# Patient Record
Sex: MALE | Race: WHITE | Employment: OTHER | URBAN - NONMETROPOLITAN AREA
[De-identification: names, ages, dates, MRNs, and addresses within clinical notes are randomized per-mention and may not be internally consistent; named-entity substitution may affect disease eponyms.]

---

## 2018-11-14 RX ORDER — M-VIT,TX,IRON,MINS/CALC/FOLIC 27MG-0.4MG
1 TABLET ORAL DAILY
COMMUNITY

## 2018-11-14 RX ORDER — ASCORBIC ACID 500 MG
500 TABLET ORAL DAILY
COMMUNITY

## 2018-11-15 ENCOUNTER — HOSPITAL ENCOUNTER (OUTPATIENT)
Age: 68
Setting detail: SPECIMEN
Discharge: HOME | End: 2018-11-15
Payer: MEDICARE

## 2018-11-15 ENCOUNTER — OFFICE VISIT (OUTPATIENT)
Dept: UROLOGY | Age: 68
End: 2018-11-15
Payer: MEDICARE

## 2018-11-15 VITALS — TEMPERATURE: 97.5 F | WEIGHT: 144 LBS | DIASTOLIC BLOOD PRESSURE: 80 MMHG | SYSTOLIC BLOOD PRESSURE: 120 MMHG

## 2018-11-15 DIAGNOSIS — K59.00 CONSTIPATION, UNSPECIFIED CONSTIPATION TYPE: ICD-10-CM

## 2018-11-15 DIAGNOSIS — R33.9 URINARY RETENTION: Primary | ICD-10-CM

## 2018-11-15 DIAGNOSIS — R33.9 URINARY RETENTION: ICD-10-CM

## 2018-11-15 LAB
-: ABNORMAL
AMORPHOUS: ABNORMAL
BACTERIA: ABNORMAL
BILIRUBIN URINE: NEGATIVE
CASTS UA: ABNORMAL /LPF
COLOR: YELLOW
COMMENT UA: ABNORMAL
CRYSTALS, UA: ABNORMAL /HPF
EPITHELIAL CELLS UA: ABNORMAL /HPF (ref 0–5)
GLUCOSE URINE: NEGATIVE
KETONES, URINE: ABNORMAL
LEUKOCYTE ESTERASE, URINE: NEGATIVE
MUCUS: ABNORMAL
NITRITE, URINE: NEGATIVE
OTHER OBSERVATIONS UA: ABNORMAL
PH UA: 6.5 (ref 5–9)
PROTEIN UA: ABNORMAL
RBC UA: ABNORMAL /HPF (ref 0–2)
RENAL EPITHELIAL, UA: ABNORMAL /HPF
SPECIFIC GRAVITY UA: 1.02 (ref 1.01–1.02)
TRICHOMONAS: ABNORMAL
TURBIDITY: ABNORMAL
URINE HGB: ABNORMAL
UROBILINOGEN, URINE: NORMAL
WBC UA: ABNORMAL /HPF (ref 0–5)
YEAST: ABNORMAL

## 2018-11-15 PROCEDURE — 4040F PNEUMOC VAC/ADMIN/RCVD: CPT | Performed by: NURSE PRACTITIONER

## 2018-11-15 PROCEDURE — 87086 URINE CULTURE/COLONY COUNT: CPT

## 2018-11-15 PROCEDURE — 87077 CULTURE AEROBIC IDENTIFY: CPT

## 2018-11-15 PROCEDURE — 1036F TOBACCO NON-USER: CPT | Performed by: NURSE PRACTITIONER

## 2018-11-15 PROCEDURE — 1123F ACP DISCUSS/DSCN MKR DOCD: CPT | Performed by: NURSE PRACTITIONER

## 2018-11-15 PROCEDURE — 99204 OFFICE O/P NEW MOD 45 MIN: CPT | Performed by: NURSE PRACTITIONER

## 2018-11-15 PROCEDURE — 87186 SC STD MICRODIL/AGAR DIL: CPT

## 2018-11-15 PROCEDURE — 3017F COLORECTAL CA SCREEN DOC REV: CPT | Performed by: NURSE PRACTITIONER

## 2018-11-15 PROCEDURE — G8484 FLU IMMUNIZE NO ADMIN: HCPCS | Performed by: NURSE PRACTITIONER

## 2018-11-15 PROCEDURE — 81001 URINALYSIS AUTO W/SCOPE: CPT

## 2018-11-15 PROCEDURE — 1101F PT FALLS ASSESS-DOCD LE1/YR: CPT | Performed by: NURSE PRACTITIONER

## 2018-11-15 PROCEDURE — G8421 BMI NOT CALCULATED: HCPCS | Performed by: NURSE PRACTITIONER

## 2018-11-15 PROCEDURE — G8427 DOCREV CUR MEDS BY ELIG CLIN: HCPCS | Performed by: NURSE PRACTITIONER

## 2018-11-15 RX ORDER — DOCUSATE SODIUM 100 MG/1
100 CAPSULE, LIQUID FILLED ORAL 2 TIMES DAILY
Qty: 60 CAPSULE | Refills: 2 | Status: SHIPPED | OUTPATIENT
Start: 2018-11-15 | End: 2018-12-10

## 2018-11-15 RX ORDER — TAMSULOSIN HYDROCHLORIDE 0.4 MG/1
0.4 CAPSULE ORAL EVERY EVENING
Qty: 30 CAPSULE | Refills: 11 | Status: SHIPPED | OUTPATIENT
Start: 2018-11-15 | End: 2018-12-10

## 2018-11-16 LAB
CULTURE: ABNORMAL
Lab: ABNORMAL
ORGANISM: ABNORMAL
SPECIMEN DESCRIPTION: ABNORMAL
STATUS: ABNORMAL

## 2018-11-17 PROBLEM — R33.9 URINARY RETENTION: Status: ACTIVE | Noted: 2018-11-17

## 2018-11-17 ASSESSMENT — ENCOUNTER SYMPTOMS
EYE PAIN: 0
ABDOMINAL PAIN: 0
CONSTIPATION: 1
WHEEZING: 0
VOMITING: 0
SHORTNESS OF BREATH: 0
BACK PAIN: 0
NAUSEA: 0
COUGH: 0
COLOR CHANGE: 0
EYE REDNESS: 0

## 2018-11-19 ENCOUNTER — TELEPHONE (OUTPATIENT)
Dept: UROLOGY | Age: 68
End: 2018-11-19

## 2018-11-19 DIAGNOSIS — R33.9 URINARY RETENTION: Primary | ICD-10-CM

## 2018-11-19 DIAGNOSIS — R33.9 URINARY RETENTION: ICD-10-CM

## 2018-11-19 RX ORDER — CIPROFLOXACIN 500 MG/1
500 TABLET, FILM COATED ORAL 2 TIMES DAILY
Qty: 20 TABLET | Refills: 0 | Status: SHIPPED | OUTPATIENT
Start: 2018-11-19 | End: 2018-11-29 | Stop reason: SDUPTHER

## 2018-11-19 NOTE — TELEPHONE ENCOUNTER
Renal function is normal. I sent in culture specific cipro to treat UTI. Take this antibiotic until gone. Take this with food and eat yogurt once per day to prevent GI upset. If you develop nausea, vomiting, or fevers call the office or go to the ER.  Keep f/u as planned

## 2018-11-21 ENCOUNTER — TELEPHONE (OUTPATIENT)
Dept: UROLOGY | Age: 68
End: 2018-11-21

## 2018-11-26 ENCOUNTER — TELEPHONE (OUTPATIENT)
Dept: UROLOGY | Age: 68
End: 2018-11-26

## 2018-11-26 ENCOUNTER — OFFICE VISIT (OUTPATIENT)
Dept: UROLOGY | Age: 68
End: 2018-11-26
Payer: MEDICARE

## 2018-11-26 VITALS
SYSTOLIC BLOOD PRESSURE: 118 MMHG | DIASTOLIC BLOOD PRESSURE: 76 MMHG | TEMPERATURE: 98.1 F | HEART RATE: 71 BPM | WEIGHT: 146 LBS

## 2018-11-26 DIAGNOSIS — K59.00 CONSTIPATION, UNSPECIFIED CONSTIPATION TYPE: ICD-10-CM

## 2018-11-26 DIAGNOSIS — R33.9 URINARY RETENTION: Primary | ICD-10-CM

## 2018-11-26 PROCEDURE — 51798 US URINE CAPACITY MEASURE: CPT | Performed by: NURSE PRACTITIONER

## 2018-11-26 PROCEDURE — 1036F TOBACCO NON-USER: CPT | Performed by: NURSE PRACTITIONER

## 2018-11-26 PROCEDURE — 99213 OFFICE O/P EST LOW 20 MIN: CPT | Performed by: NURSE PRACTITIONER

## 2018-11-26 PROCEDURE — G8427 DOCREV CUR MEDS BY ELIG CLIN: HCPCS | Performed by: NURSE PRACTITIONER

## 2018-11-26 PROCEDURE — G8484 FLU IMMUNIZE NO ADMIN: HCPCS | Performed by: NURSE PRACTITIONER

## 2018-11-26 PROCEDURE — 1101F PT FALLS ASSESS-DOCD LE1/YR: CPT | Performed by: NURSE PRACTITIONER

## 2018-11-26 PROCEDURE — G8421 BMI NOT CALCULATED: HCPCS | Performed by: NURSE PRACTITIONER

## 2018-11-26 PROCEDURE — 1123F ACP DISCUSS/DSCN MKR DOCD: CPT | Performed by: NURSE PRACTITIONER

## 2018-11-26 PROCEDURE — 51700 IRRIGATION OF BLADDER: CPT | Performed by: NURSE PRACTITIONER

## 2018-11-26 PROCEDURE — 4040F PNEUMOC VAC/ADMIN/RCVD: CPT | Performed by: NURSE PRACTITIONER

## 2018-11-26 PROCEDURE — 3017F COLORECTAL CA SCREEN DOC REV: CPT | Performed by: NURSE PRACTITIONER

## 2018-11-26 ASSESSMENT — ENCOUNTER SYMPTOMS
EYE PAIN: 0
CONSTIPATION: 0
EYE REDNESS: 0
WHEEZING: 0
SHORTNESS OF BREATH: 0
ABDOMINAL PAIN: 0
NAUSEA: 0
COUGH: 0
BACK PAIN: 0
COLOR CHANGE: 0
VOMITING: 0

## 2018-11-28 ENCOUNTER — TELEPHONE (OUTPATIENT)
Dept: UROLOGY | Age: 68
End: 2018-11-28

## 2018-11-29 ENCOUNTER — TELEPHONE (OUTPATIENT)
Dept: UROLOGY | Age: 68
End: 2018-11-29

## 2018-11-29 DIAGNOSIS — R35.1 NOCTURIA: ICD-10-CM

## 2018-11-29 DIAGNOSIS — R35.0 FREQUENCY OF URINATION: Primary | ICD-10-CM

## 2018-11-29 RX ORDER — CIPROFLOXACIN 500 MG/1
500 TABLET, FILM COATED ORAL 2 TIMES DAILY
Qty: 10 TABLET | Refills: 0 | Status: SHIPPED | OUTPATIENT
Start: 2018-11-29 | End: 2018-12-04

## 2018-11-29 NOTE — TELEPHONE ENCOUNTER
The flomax helps patient void more effectively. It does not cause frequency or nocturia, it should help these symptoms. It is likely an infection. Drop off a UACS now. I sent in 5 more days of the antibiotic.

## 2018-11-30 ENCOUNTER — HOSPITAL ENCOUNTER (OUTPATIENT)
Age: 68
Discharge: HOME | End: 2018-11-30
Payer: MEDICARE

## 2018-11-30 DIAGNOSIS — R35.1 NOCTURIA: ICD-10-CM

## 2018-11-30 DIAGNOSIS — R35.0 FREQUENCY OF URINATION: ICD-10-CM

## 2018-11-30 LAB
-: ABNORMAL
AMORPHOUS: ABNORMAL
BACTERIA: ABNORMAL
BILIRUBIN URINE: NEGATIVE
CASTS UA: ABNORMAL /LPF
COLOR: YELLOW
COMMENT UA: ABNORMAL
CRYSTALS, UA: ABNORMAL /HPF
EPITHELIAL CELLS UA: ABNORMAL /HPF (ref 0–5)
GLUCOSE URINE: NEGATIVE
KETONES, URINE: NEGATIVE
LEUKOCYTE ESTERASE, URINE: NEGATIVE
MUCUS: ABNORMAL
NITRITE, URINE: NEGATIVE
OTHER OBSERVATIONS UA: ABNORMAL
PH UA: 6 (ref 5–9)
PROTEIN UA: NEGATIVE
RBC UA: ABNORMAL /HPF (ref 0–2)
RENAL EPITHELIAL, UA: ABNORMAL /HPF
SPECIFIC GRAVITY UA: <1.005 (ref 1.01–1.02)
TRICHOMONAS: ABNORMAL
TURBIDITY: CLEAR
URINE HGB: ABNORMAL
UROBILINOGEN, URINE: NORMAL
WBC UA: ABNORMAL /HPF (ref 0–5)
YEAST: ABNORMAL

## 2018-11-30 PROCEDURE — 81001 URINALYSIS AUTO W/SCOPE: CPT

## 2018-11-30 PROCEDURE — 87086 URINE CULTURE/COLONY COUNT: CPT

## 2018-12-01 LAB
CULTURE: NO GROWTH
Lab: NORMAL
SPECIMEN DESCRIPTION: NORMAL
STATUS: NORMAL

## 2018-12-03 ENCOUNTER — TELEPHONE (OUTPATIENT)
Dept: UROLOGY | Age: 68
End: 2018-12-03

## 2018-12-03 NOTE — TELEPHONE ENCOUNTER
----- Message from ALVERTO Heck - CNP sent at 12/3/2018 10:38 AM EST -----  Call pt - urine cx reviewed and negative for UTI & for significant microhematuria

## 2018-12-10 ENCOUNTER — OFFICE VISIT (OUTPATIENT)
Dept: UROLOGY | Age: 68
End: 2018-12-10
Payer: MEDICARE

## 2018-12-10 VITALS
SYSTOLIC BLOOD PRESSURE: 110 MMHG | WEIGHT: 140 LBS | BODY MASS INDEX: 23.9 KG/M2 | DIASTOLIC BLOOD PRESSURE: 70 MMHG | HEIGHT: 64 IN

## 2018-12-10 DIAGNOSIS — R33.9 URINARY RETENTION: Primary | ICD-10-CM

## 2018-12-10 DIAGNOSIS — N40.1 BPH WITH OBSTRUCTION/LOWER URINARY TRACT SYMPTOMS: ICD-10-CM

## 2018-12-10 DIAGNOSIS — N13.8 BPH WITH OBSTRUCTION/LOWER URINARY TRACT SYMPTOMS: ICD-10-CM

## 2018-12-10 PROCEDURE — 1101F PT FALLS ASSESS-DOCD LE1/YR: CPT | Performed by: UROLOGY

## 2018-12-10 PROCEDURE — G8420 CALC BMI NORM PARAMETERS: HCPCS | Performed by: UROLOGY

## 2018-12-10 PROCEDURE — 4040F PNEUMOC VAC/ADMIN/RCVD: CPT | Performed by: UROLOGY

## 2018-12-10 PROCEDURE — G8427 DOCREV CUR MEDS BY ELIG CLIN: HCPCS | Performed by: UROLOGY

## 2018-12-10 PROCEDURE — 3017F COLORECTAL CA SCREEN DOC REV: CPT | Performed by: UROLOGY

## 2018-12-10 PROCEDURE — G8484 FLU IMMUNIZE NO ADMIN: HCPCS | Performed by: UROLOGY

## 2018-12-10 PROCEDURE — 99214 OFFICE O/P EST MOD 30 MIN: CPT | Performed by: UROLOGY

## 2018-12-10 PROCEDURE — 51798 US URINE CAPACITY MEASURE: CPT | Performed by: UROLOGY

## 2018-12-10 PROCEDURE — 1036F TOBACCO NON-USER: CPT | Performed by: UROLOGY

## 2018-12-10 PROCEDURE — 1123F ACP DISCUSS/DSCN MKR DOCD: CPT | Performed by: UROLOGY

## 2018-12-10 RX ORDER — CAPECITABINE 500 MG/1
1500 TABLET, FILM COATED ORAL 2 TIMES DAILY
Status: ON HOLD | COMMUNITY
End: 2018-12-27 | Stop reason: HOSPADM

## 2018-12-10 ASSESSMENT — ENCOUNTER SYMPTOMS
EYE PAIN: 0
COUGH: 0
COLOR CHANGE: 0
WHEEZING: 0
EYE REDNESS: 0
ABDOMINAL PAIN: 0
NAUSEA: 0
SHORTNESS OF BREATH: 0
BACK PAIN: 0
VOMITING: 0

## 2018-12-24 ENCOUNTER — APPOINTMENT (OUTPATIENT)
Dept: CT IMAGING | Age: 68
DRG: 176 | End: 2018-12-24
Payer: MEDICARE

## 2018-12-24 ENCOUNTER — HOSPITAL ENCOUNTER (INPATIENT)
Age: 68
LOS: 3 days | Discharge: HOME | DRG: 176 | End: 2018-12-27
Attending: EMERGENCY MEDICINE
Payer: MEDICARE

## 2018-12-24 DIAGNOSIS — I26.02 ACUTE SADDLE PULMONARY EMBOLISM WITH ACUTE COR PULMONALE (HCC): Primary | ICD-10-CM

## 2018-12-24 PROBLEM — I26.99 PULMONARY EMBOLISM, BILATERAL (HCC): Status: ACTIVE | Noted: 2018-12-24

## 2018-12-24 LAB
ABSOLUTE EOS #: 0.13 K/UL (ref 0–0.44)
ABSOLUTE IMMATURE GRANULOCYTE: 0.04 K/UL (ref 0–0.3)
ABSOLUTE LYMPH #: 1.73 K/UL (ref 1.1–3.7)
ABSOLUTE MONO #: 0.63 K/UL (ref 0.1–1.2)
ACETAMINOPHEN LEVEL: 8 UG/ML (ref 10–30)
ALBUMIN SERPL-MCNC: 3.4 G/DL (ref 3.5–5.2)
ALBUMIN/GLOBULIN RATIO: 1.4 (ref 1–2.5)
ALLEN TEST: ABNORMAL
ALP BLD-CCNC: 115 U/L (ref 40–129)
ALT SERPL-CCNC: 22 U/L (ref 5–41)
AMPHETAMINE SCREEN URINE: NEGATIVE
ANION GAP SERPL CALCULATED.3IONS-SCNC: 13 MMOL/L (ref 9–17)
ANION GAP: 9 MMOL/L (ref 7–16)
AST SERPL-CCNC: 27 U/L
BARBITURATE SCREEN URINE: NEGATIVE
BASOPHILS # BLD: 1 % (ref 0–2)
BASOPHILS ABSOLUTE: 0.06 K/UL (ref 0–0.2)
BENZODIAZEPINE SCREEN, URINE: NEGATIVE
BILIRUB SERPL-MCNC: 0.52 MG/DL (ref 0.3–1.2)
BILIRUBIN DIRECT: 0.15 MG/DL
BILIRUBIN URINE: NEGATIVE
BILIRUBIN, INDIRECT: 0.37 MG/DL (ref 0–1)
BNP INTERPRETATION: ABNORMAL
BUN BLDV-MCNC: 15 MG/DL (ref 8–23)
BUN/CREAT BLD: ABNORMAL (ref 9–20)
BUPRENORPHINE URINE: NORMAL
CALCIUM SERPL-MCNC: 8.8 MG/DL (ref 8.6–10.4)
CANNABINOID SCREEN URINE: NEGATIVE
CHLORIDE BLD-SCNC: 105 MMOL/L (ref 98–107)
CO2: 23 MMOL/L (ref 20–31)
COCAINE METABOLITE, URINE: NEGATIVE
COLOR: YELLOW
COMMENT UA: ABNORMAL
CREAT SERPL-MCNC: 0.92 MG/DL (ref 0.7–1.2)
DIFFERENTIAL TYPE: ABNORMAL
EKG ATRIAL RATE: 77 BPM
EKG P AXIS: 63 DEGREES
EKG P-R INTERVAL: 176 MS
EKG Q-T INTERVAL: 442 MS
EKG QRS DURATION: 90 MS
EKG QTC CALCULATION (BAZETT): 500 MS
EKG R AXIS: 51 DEGREES
EKG T AXIS: 47 DEGREES
EKG VENTRICULAR RATE: 77 BPM
EOSINOPHILS RELATIVE PERCENT: 2 % (ref 1–4)
ETHANOL PERCENT: <0.01 %
ETHANOL: <10 MG/DL
FIO2: ABNORMAL
GFR AFRICAN AMERICAN: >60 ML/MIN
GFR NON-AFRICAN AMERICAN: >60 ML/MIN
GFR NON-AFRICAN AMERICAN: >60 ML/MIN
GFR SERPL CREATININE-BSD FRML MDRD: >60 ML/MIN
GFR SERPL CREATININE-BSD FRML MDRD: ABNORMAL ML/MIN/{1.73_M2}
GFR SERPL CREATININE-BSD FRML MDRD: ABNORMAL ML/MIN/{1.73_M2}
GFR SERPL CREATININE-BSD FRML MDRD: NORMAL ML/MIN/{1.73_M2}
GLOBULIN: ABNORMAL G/DL (ref 1.5–3.8)
GLUCOSE BLD-MCNC: 137 MG/DL (ref 74–100)
GLUCOSE BLD-MCNC: 143 MG/DL (ref 70–99)
GLUCOSE URINE: NEGATIVE
HCO3 VENOUS: 28.4 MMOL/L (ref 22–29)
HCT VFR BLD CALC: 40.7 % (ref 40.7–50.3)
HEMOGLOBIN: 12.8 G/DL (ref 13–17)
IMMATURE GRANULOCYTES: 1 %
INR BLD: 1.2
INR BLD: 1.2
KETONES, URINE: NEGATIVE
LEUKOCYTE ESTERASE, URINE: NEGATIVE
LIPASE: 32 U/L (ref 13–60)
LYMPHOCYTES # BLD: 23 % (ref 24–43)
MAGNESIUM: 2.2 MG/DL (ref 1.6–2.6)
MCH RBC QN AUTO: 28.8 PG (ref 25.2–33.5)
MCHC RBC AUTO-ENTMCNC: 31.4 G/DL (ref 28.4–34.8)
MCV RBC AUTO: 91.5 FL (ref 82.6–102.9)
MDMA URINE: NORMAL
METHADONE SCREEN, URINE: NEGATIVE
METHAMPHETAMINE, URINE: NORMAL
MODE: ABNORMAL
MONOCYTES # BLD: 8 % (ref 3–12)
MRSA, DNA, NASAL: NORMAL
NEGATIVE BASE EXCESS, VEN: ABNORMAL (ref 0–2)
NITRITE, URINE: NEGATIVE
NRBC AUTOMATED: 0 PER 100 WBC
O2 DEVICE/FLOW/%: ABNORMAL
O2 SAT, VEN: 29 % (ref 60–85)
OPIATES, URINE: NEGATIVE
OXYCODONE SCREEN URINE: NEGATIVE
PARTIAL THROMBOPLASTIN TIME: 21.4 SEC (ref 20.5–30.5)
PARTIAL THROMBOPLASTIN TIME: 53.6 SEC (ref 20.5–30.5)
PARTIAL THROMBOPLASTIN TIME: 92.9 SEC (ref 20.5–30.5)
PATIENT TEMP: ABNORMAL
PCO2, VEN: 54.8 MM HG (ref 41–51)
PDW BLD-RTO: 15 % (ref 11.8–14.4)
PH UA: 7.5 (ref 5–8)
PH VENOUS: 7.32 (ref 7.32–7.43)
PHENCYCLIDINE, URINE: NEGATIVE
PHOSPHORUS: 3.8 MG/DL (ref 2.5–4.5)
PLATELET # BLD: ABNORMAL K/UL (ref 138–453)
PLATELET ESTIMATE: ABNORMAL
PLATELET, FLUORESCENCE: 85 K/UL (ref 138–453)
PLATELET, IMMATURE FRACTION: 6.2 % (ref 1.1–10.3)
PMV BLD AUTO: ABNORMAL FL (ref 8.1–13.5)
PO2, VEN: 20.6 MM HG (ref 30–50)
POC CHLORIDE: 107 MMOL/L (ref 98–107)
POC CREATININE: 0.72 MG/DL (ref 0.51–1.19)
POC HEMATOCRIT: 39 % (ref 41–53)
POC HEMOGLOBIN: 13.2 G/DL (ref 13.5–17.5)
POC IONIZED CALCIUM: 1.25 MMOL/L (ref 1.15–1.33)
POC LACTIC ACID: 2.38 MMOL/L (ref 0.56–1.39)
POC PCO2 TEMP: ABNORMAL MM HG
POC PH TEMP: ABNORMAL
POC PO2 TEMP: ABNORMAL MM HG
POC POTASSIUM: 4 MMOL/L (ref 3.5–4.5)
POC SODIUM: 144 MMOL/L (ref 138–146)
POSITIVE BASE EXCESS, VEN: 1 (ref 0–3)
POTASSIUM SERPL-SCNC: 4.1 MMOL/L (ref 3.7–5.3)
PRO-BNP: 623 PG/ML
PROPOXYPHENE, URINE: NORMAL
PROTEIN UA: NEGATIVE
PROTHROMBIN TIME: 12.5 SEC (ref 9–12)
PROTHROMBIN TIME: 12.7 SEC (ref 9–12)
RBC # BLD: 4.45 M/UL (ref 4.21–5.77)
RBC # BLD: ABNORMAL 10*6/UL
SALICYLATE LEVEL: <1 MG/DL (ref 3–10)
SAMPLE SITE: ABNORMAL
SEG NEUTROPHILS: 66 % (ref 36–65)
SEGMENTED NEUTROPHILS ABSOLUTE COUNT: 4.98 K/UL (ref 1.5–8.1)
SODIUM BLD-SCNC: 141 MMOL/L (ref 135–144)
SPECIFIC GRAVITY UA: 1.04 (ref 1–1.03)
SPECIMEN DESCRIPTION: NORMAL
TEST INFORMATION: NORMAL
TOTAL CO2, VENOUS: 30 MMOL/L (ref 23–30)
TOTAL PROTEIN: 5.9 G/DL (ref 6.4–8.3)
TOXIC TRICYCLIC SC,BLOOD: NEGATIVE
TRICYCLIC ANTIDEPRESSANTS, UR: NORMAL
TROPONIN INTERP: ABNORMAL
TROPONIN T: ABNORMAL NG/ML
TROPONIN, HIGH SENSITIVITY: 103 NG/L (ref 0–22)
TROPONIN, HIGH SENSITIVITY: 118 NG/L (ref 0–22)
TROPONIN, HIGH SENSITIVITY: 126 NG/L (ref 0–22)
TROPONIN, HIGH SENSITIVITY: 34 NG/L (ref 0–22)
TURBIDITY: CLEAR
URINE HGB: NEGATIVE
UROBILINOGEN, URINE: NORMAL
WBC # BLD: 7.6 K/UL (ref 3.5–11.3)
WBC # BLD: ABNORMAL 10*3/UL

## 2018-12-24 PROCEDURE — 99223 1ST HOSP IP/OBS HIGH 75: CPT | Performed by: PSYCHIATRY & NEUROLOGY

## 2018-12-24 PROCEDURE — 84132 ASSAY OF SERUM POTASSIUM: CPT

## 2018-12-24 PROCEDURE — 81003 URINALYSIS AUTO W/O SCOPE: CPT

## 2018-12-24 PROCEDURE — 83605 ASSAY OF LACTIC ACID: CPT

## 2018-12-24 PROCEDURE — 80047 BASIC METABLC PNL IONIZED CA: CPT

## 2018-12-24 PROCEDURE — 82947 ASSAY GLUCOSE BLOOD QUANT: CPT

## 2018-12-24 PROCEDURE — 6360000002 HC RX W HCPCS: Performed by: STUDENT IN AN ORGANIZED HEALTH CARE EDUCATION/TRAINING PROGRAM

## 2018-12-24 PROCEDURE — 85025 COMPLETE CBC W/AUTO DIFF WBC: CPT

## 2018-12-24 PROCEDURE — 99285 EMERGENCY DEPT VISIT HI MDM: CPT

## 2018-12-24 PROCEDURE — 93005 ELECTROCARDIOGRAM TRACING: CPT

## 2018-12-24 PROCEDURE — 2580000003 HC RX 258: Performed by: STUDENT IN AN ORGANIZED HEALTH CARE EDUCATION/TRAINING PROGRAM

## 2018-12-24 PROCEDURE — 87641 MR-STAPH DNA AMP PROBE: CPT

## 2018-12-24 PROCEDURE — 84295 ASSAY OF SERUM SODIUM: CPT

## 2018-12-24 PROCEDURE — 70450 CT HEAD/BRAIN W/O DYE: CPT

## 2018-12-24 PROCEDURE — 6370000000 HC RX 637 (ALT 250 FOR IP): Performed by: NURSE PRACTITIONER

## 2018-12-24 PROCEDURE — 80307 DRUG TEST PRSMV CHEM ANLYZR: CPT

## 2018-12-24 PROCEDURE — 82565 ASSAY OF CREATININE: CPT

## 2018-12-24 PROCEDURE — 2000000000 HC ICU R&B

## 2018-12-24 PROCEDURE — 71260 CT THORAX DX C+: CPT

## 2018-12-24 PROCEDURE — 85610 PROTHROMBIN TIME: CPT

## 2018-12-24 PROCEDURE — 85055 RETICULATED PLATELET ASSAY: CPT

## 2018-12-24 PROCEDURE — 70496 CT ANGIOGRAPHY HEAD: CPT

## 2018-12-24 PROCEDURE — 83690 ASSAY OF LIPASE: CPT

## 2018-12-24 PROCEDURE — 82330 ASSAY OF CALCIUM: CPT

## 2018-12-24 PROCEDURE — 96375 TX/PRO/DX INJ NEW DRUG ADDON: CPT

## 2018-12-24 PROCEDURE — 82803 BLOOD GASES ANY COMBINATION: CPT

## 2018-12-24 PROCEDURE — 83735 ASSAY OF MAGNESIUM: CPT

## 2018-12-24 PROCEDURE — 84484 ASSAY OF TROPONIN QUANT: CPT

## 2018-12-24 PROCEDURE — 99291 CRITICAL CARE FIRST HOUR: CPT | Performed by: INTERNAL MEDICINE

## 2018-12-24 PROCEDURE — 2580000003 HC RX 258: Performed by: PSYCHIATRY & NEUROLOGY

## 2018-12-24 PROCEDURE — 85014 HEMATOCRIT: CPT

## 2018-12-24 PROCEDURE — G0480 DRUG TEST DEF 1-7 CLASSES: HCPCS

## 2018-12-24 PROCEDURE — 96374 THER/PROPH/DIAG INJ IV PUSH: CPT

## 2018-12-24 PROCEDURE — 6360000004 HC RX CONTRAST MEDICATION: Performed by: EMERGENCY MEDICINE

## 2018-12-24 PROCEDURE — 36415 COLL VENOUS BLD VENIPUNCTURE: CPT

## 2018-12-24 PROCEDURE — 80076 HEPATIC FUNCTION PANEL: CPT

## 2018-12-24 PROCEDURE — 82435 ASSAY OF BLOOD CHLORIDE: CPT

## 2018-12-24 PROCEDURE — 70498 CT ANGIOGRAPHY NECK: CPT

## 2018-12-24 PROCEDURE — 83880 ASSAY OF NATRIURETIC PEPTIDE: CPT

## 2018-12-24 PROCEDURE — 80048 BASIC METABOLIC PNL TOTAL CA: CPT

## 2018-12-24 PROCEDURE — 84100 ASSAY OF PHOSPHORUS: CPT

## 2018-12-24 PROCEDURE — 85730 THROMBOPLASTIN TIME PARTIAL: CPT

## 2018-12-24 PROCEDURE — 6370000000 HC RX 637 (ALT 250 FOR IP): Performed by: EMERGENCY MEDICINE

## 2018-12-24 RX ORDER — SODIUM CHLORIDE 9 MG/ML
INJECTION, SOLUTION INTRAVENOUS CONTINUOUS
Status: DISCONTINUED | OUTPATIENT
Start: 2018-12-24 | End: 2018-12-25

## 2018-12-24 RX ORDER — HEPARIN SODIUM 10000 [USP'U]/100ML
18 INJECTION, SOLUTION INTRAVENOUS CONTINUOUS
Status: DISCONTINUED | OUTPATIENT
Start: 2018-12-24 | End: 2018-12-26

## 2018-12-24 RX ORDER — ONDANSETRON 2 MG/ML
4 INJECTION INTRAMUSCULAR; INTRAVENOUS ONCE
Status: COMPLETED | OUTPATIENT
Start: 2018-12-24 | End: 2018-12-24

## 2018-12-24 RX ORDER — 0.9 % SODIUM CHLORIDE 0.9 %
500 INTRAVENOUS SOLUTION INTRAVENOUS ONCE
Status: COMPLETED | OUTPATIENT
Start: 2018-12-24 | End: 2018-12-24

## 2018-12-24 RX ORDER — ASPIRIN 81 MG/1
81 TABLET, CHEWABLE ORAL ONCE
Status: COMPLETED | OUTPATIENT
Start: 2018-12-24 | End: 2018-12-24

## 2018-12-24 RX ORDER — HEPARIN SODIUM 1000 [USP'U]/ML
40 INJECTION, SOLUTION INTRAVENOUS; SUBCUTANEOUS PRN
Status: DISCONTINUED | OUTPATIENT
Start: 2018-12-24 | End: 2018-12-26

## 2018-12-24 RX ORDER — PROMETHAZINE HYDROCHLORIDE 25 MG/ML
12.5 INJECTION, SOLUTION INTRAMUSCULAR; INTRAVENOUS ONCE
Status: COMPLETED | OUTPATIENT
Start: 2018-12-24 | End: 2018-12-24

## 2018-12-24 RX ORDER — HEPARIN SODIUM 1000 [USP'U]/ML
80 INJECTION, SOLUTION INTRAVENOUS; SUBCUTANEOUS PRN
Status: DISCONTINUED | OUTPATIENT
Start: 2018-12-24 | End: 2018-12-26

## 2018-12-24 RX ORDER — ONDANSETRON 2 MG/ML
4 INJECTION INTRAMUSCULAR; INTRAVENOUS EVERY 6 HOURS PRN
Status: DISCONTINUED | OUTPATIENT
Start: 2018-12-24 | End: 2018-12-27 | Stop reason: HOSPADM

## 2018-12-24 RX ORDER — SODIUM CHLORIDE 0.9 % (FLUSH) 0.9 %
10 SYRINGE (ML) INJECTION PRN
Status: DISCONTINUED | OUTPATIENT
Start: 2018-12-24 | End: 2018-12-27 | Stop reason: HOSPADM

## 2018-12-24 RX ORDER — HEPARIN SODIUM 1000 [USP'U]/ML
80 INJECTION, SOLUTION INTRAVENOUS; SUBCUTANEOUS ONCE
Status: COMPLETED | OUTPATIENT
Start: 2018-12-24 | End: 2018-12-24

## 2018-12-24 RX ORDER — SODIUM CHLORIDE 0.9 % (FLUSH) 0.9 %
10 SYRINGE (ML) INJECTION EVERY 12 HOURS SCHEDULED
Status: DISCONTINUED | OUTPATIENT
Start: 2018-12-24 | End: 2018-12-27 | Stop reason: HOSPADM

## 2018-12-24 RX ADMIN — HEPARIN SODIUM AND DEXTROSE 18 UNITS/KG/HR: 10000; 5 INJECTION INTRAVENOUS at 13:37

## 2018-12-24 RX ADMIN — ONDANSETRON HYDROCHLORIDE 4 MG: 2 INJECTION, SOLUTION INTRAMUSCULAR; INTRAVENOUS at 12:35

## 2018-12-24 RX ADMIN — SODIUM CHLORIDE 500 ML: 0.9 INJECTION, SOLUTION INTRAVENOUS at 13:26

## 2018-12-24 RX ADMIN — SODIUM CHLORIDE 500 ML: 0.9 INJECTION, SOLUTION INTRAVENOUS at 11:20

## 2018-12-24 RX ADMIN — IOVERSOL 90 ML: 741 INJECTION INTRA-ARTERIAL; INTRAVENOUS at 13:20

## 2018-12-24 RX ADMIN — SODIUM CHLORIDE: 9 INJECTION, SOLUTION INTRAVENOUS at 21:25

## 2018-12-24 RX ADMIN — PROMETHAZINE HYDROCHLORIDE 12.5 MG: 25 INJECTION, SOLUTION INTRAMUSCULAR; INTRAVENOUS at 13:25

## 2018-12-24 RX ADMIN — SODIUM CHLORIDE: 9 INJECTION, SOLUTION INTRAVENOUS at 17:24

## 2018-12-24 RX ADMIN — HEPARIN SODIUM 5080 UNITS: 1000 INJECTION, SOLUTION INTRAVENOUS; SUBCUTANEOUS at 13:36

## 2018-12-24 RX ADMIN — ASPIRIN 81 MG: 81 TABLET ORAL at 14:10

## 2018-12-24 RX ADMIN — IOVERSOL 75 ML: 741 INJECTION INTRA-ARTERIAL; INTRAVENOUS at 13:36

## 2018-12-24 RX ADMIN — BENZOCAINE AND MENTHOL 1 LOZENGE: 15; 3.6 LOZENGE ORAL at 21:35

## 2018-12-24 ASSESSMENT — PAIN SCALES - GENERAL
PAINLEVEL_OUTOF10: 3
PAINLEVEL_OUTOF10: 0
PAINLEVEL_OUTOF10: 0

## 2018-12-24 ASSESSMENT — ENCOUNTER SYMPTOMS
RHINORRHEA: 0
VOMITING: 1
SORE THROAT: 0
SHORTNESS OF BREATH: 0
NAUSEA: 1

## 2018-12-24 ASSESSMENT — PAIN DESCRIPTION - LOCATION: LOCATION: THROAT

## 2018-12-24 ASSESSMENT — PAIN DESCRIPTION - PAIN TYPE: TYPE: ACUTE PAIN

## 2018-12-24 NOTE — H&P
MULTIVITAMIN-MINERALS) tablet Take 1 tablet by mouth daily   Yes Historical Provider, MD   vitamin C (ASCORBIC ACID) 500 MG tablet Take 500 mg by mouth daily   Yes Historical Provider, MD       Social History:   TOBACCO:   reports that he has never smoked. He has never used smokeless tobacco.  ETOH:   reports that he does not drink alcohol. DRUGS:  has no drug history on file. OCCUPATION:      Family History:   Family History   Problem Relation Age of Onset    Other Mother            REVIEW OF SYSTEMS (ROS):    CONSTITUTIONAL:   fatigue and malaise    EYES:  negative for double vision, blurred vision and photophobia     HEENT:  negative for tinnitus, epistaxis and sore throat    RESPIRATORY:  negative for cough, shortness of breath, wheezing    CARDIOVASCULAR:  negative for chest pain, palpitations, syncope, edema    GASTROINTESTINAL:  negative for nausea, vomiting, diarrhea, constipation, abdominal pain    GENITOURINARY:  negative for incontinence    MUSCULOSKELETAL:  negative for neck or back pain    NEUROLOGICAL:  Hemianopia resolved    PSYCHIATRIC:  negative for depressed mood, anxiety           Physical Exam:    Vitals: BP (!) 102/52   Pulse 96   Temp 97.7 °F (36.5 °C) (Oral)   Resp 20   Ht 5' 6\" (1.676 m)   Wt 140 lb (63.5 kg)   SpO2 93%   BMI 22.60 kg/m²     Last Body weight:   Wt Readings from Last 3 Encounters:   12/24/18 140 lb (63.5 kg)   12/10/18 140 lb (63.5 kg)   11/26/18 146 lb (66.2 kg)       Body Mass Index : Body mass index is 22.6 kg/m².         PHYSICAL EXAMINATION :    General appearance - sleepy, well appearing, and in no distress  Mental status - alert, oriented to person, place, and time  Eyes - pupils equal and reactive, extraocular eye movements intact  Mouth - mucous membranes moist, pharynx normal without lesions  Neck - supple, no significant adenopathy  Chest - clear to auscultation, no wheezes, rales or rhonchi, symmetric air entry  Heart - normal rate, regular rhythm, normal S1, S2, no murmurs, rubs, clicks or gallops  Abdomen - soft, nontender, nondistended, no masses or organomegaly  Neurological - alert, oriented, normal speech, no focal deficits . Hemianopia resolved   Extremities - peripheral pulses normal, no pedal edema, no clubbing or cyanosis  Skin - normal coloration and turgor, no rashes, no suspicious skin lesions noted         Any additional physical findings:      Laboratory findings:-    CBC:   Recent Labs      12/24/18   1221   WBC  7.6   HGB  12.8*   PLT  See Reflexed IPF Result     BMP:    Recent Labs      12/24/18   1217  12/24/18   1221   NA   --   141   K   --   4.1   CL   --   105   CO2   --   23   BUN   --   15   CREATININE  0.72  0.92   GLUCOSE   --   143*     S. Calcium:  Recent Labs      12/24/18   1221   CALCIUM  8.8     S. Ionized Calcium:No results for input(s): IONCA in the last 72 hours. S. Magnesium:  Recent Labs      12/24/18   1221   MG  2.2     S. Phosphorus:  Recent Labs      12/24/18   1221   PHOS  3.8     S. Glucose:  Recent Labs      12/24/18   1217   POCGLU  137*     Glycosylated hemoglobin A1C: No results for input(s): LABA1C in the last 72 hours. INR:   Recent Labs      12/24/18   1221   INR  1.2     Hepatic functions: No results for input(s): ALKPHOS, ALT, AST, PROT, BILITOT, BILIDIR, LABALBU in the last 72 hours. Pancreatic functions:No results for input(s): LACTA, AMYLASE in the last 72 hours. S. Lactic Acid: No results for input(s): LACTA in the last 72 hours. Cardiac enzymes:No results for input(s): CKTOTAL, CKMB, CKMBINDEX, TROPONINI in the last 72 hours. BNP:No results for input(s): BNP in the last 72 hours. Lipid profile: No results for input(s): CHOL, TRIG, HDL, LDLCALC in the last 72 hours.     Invalid input(s): LDL  Blood Gases: No results found for: PH, PCO2, PO2, HCO3, O2SAT  Thyroid functions: No results found for: TSH     Urinalysis:     Microbiology:    Cultures during this admission:     Blood cultures:                 []

## 2018-12-24 NOTE — ED PROVIDER NOTES
Years of education: N/A     Occupational History    Not on file. Social History Main Topics    Smoking status: Never Smoker    Smokeless tobacco: Never Used    Alcohol use No    Drug use: Unknown    Sexual activity: Not on file     Other Topics Concern    Not on file     Social History Narrative    No narrative on file       Family History   Problem Relation Age of Onset    Other Mother        Allergies:  Patient has no known allergies. Home Medications:  Prior to Admission medications    Medication Sig Start Date End Date Taking? Authorizing Provider   Funmi Zingiber officinalis, (FUNMI PO) Take by mouth daily   Yes Historical Provider, MD   TURMERIC PO Take by mouth daily   Yes Historical Provider, MD   capecitabine (XELODA) 500 MG chemo tablet Take 1,500 mg/m2 by mouth 2 times daily   Yes Historical Provider, MD   Multiple Vitamins-Minerals (THERAPEUTIC MULTIVITAMIN-MINERALS) tablet Take 1 tablet by mouth daily   Yes Historical Provider, MD   vitamin C (ASCORBIC ACID) 500 MG tablet Take 500 mg by mouth daily   Yes Historical Provider, MD       REVIEW OF SYSTEMS    (2-9 systems for level 4, 10 or more for level 5)      Review of Systems   Constitutional: Negative for chills and fever. HENT: Negative for rhinorrhea and sore throat. Eyes: Positive for visual disturbance. Respiratory: Negative for shortness of breath. Cardiovascular: Negative for chest pain. Gastrointestinal: Positive for nausea and vomiting. Allergic/Immunologic: Negative for environmental allergies and food allergies. Neurological: Positive for dizziness and weakness. PHYSICAL EXAM   (up to 7 for level 4, 8 or more for level 5)      INITIAL VITALS:   /88   Pulse 79   Temp 97.3 °F (36.3 °C) (Oral)   Resp 27   Ht 5' 4\" (1.626 m)   Wt 139 lb 1.6 oz (63.1 kg)   SpO2 91%   BMI 23.88 kg/m²     Physical Exam   Constitutional: He is oriented to person, place, and time.  He appears well-developed and Metabolite, Urine NEGATIVE NEG    Methadone Screen, Urine NEGATIVE NEG    Opiates, Urine NEGATIVE NEG    Phencyclidine, Urine NEGATIVE NEG    Propoxyphene, Urine NOT REPORTED NEG    Cannabinoid Scrn, Ur NEGATIVE NEG    Oxycodone Screen, Ur NEGATIVE NEG    Methamphetamine, Urine NOT REPORTED NEG    Tricyclic Antidepressants, Urine NOT REPORTED NEG    MDMA, Urine NOT REPORTED NEG    Buprenorphine Urine NOT REPORTED NEG    Test Information       Assay provides medical screening only.   The absence of expected drug(s) and/or   Magnesium   Result Value Ref Range    Magnesium 2.2 1.6 - 2.6 mg/dL   Phosphorus   Result Value Ref Range    Phosphorus 3.8 2.5 - 4.5 mg/dL   Brain Natriuretic Peptide   Result Value Ref Range    Pro- (H) <300 pg/mL    BNP Interpretation         Troponin   Result Value Ref Range    Troponin, High Sensitivity 34 (H) 0 - 22 ng/L    Troponin T NOT REPORTED <0.03 ng/mL    Troponin Interp NOT REPORTED    Troponin   Result Value Ref Range    Troponin, High Sensitivity 126 (HH) 0 - 22 ng/L    Troponin T NOT REPORTED <0.03 ng/mL    Troponin Interp NOT REPORTED    Hemoglobin and hematocrit, blood   Result Value Ref Range    POC Hemoglobin 13.2 (L) 13.5 - 17.5 g/dL    POC Hematocrit 39 (L) 41 - 53 %   SODIUM (POC)   Result Value Ref Range    POC Sodium 144 138 - 146 mmol/L   POTASSIUM (POC)   Result Value Ref Range    POC Potassium 4.0 3.5 - 4.5 mmol/L   CHLORIDE (POC)   Result Value Ref Range    POC Chloride 107 98 - 107 mmol/L   CALCIUM, IONIC (POC)   Result Value Ref Range    POC Ionized Calcium 1.25 1.15 - 1.33 mmol/L   Immature Platelet Fraction   Result Value Ref Range    Platelet, Immature Fraction 6.2 1.1 - 10.3 %    Platelet, Fluorescence 85 (L) 138 - 453 k/uL   Lipase   Result Value Ref Range    Lipase 32 13 - 60 U/L   Hepatic Function Panel   Result Value Ref Range    Alb 3.4 (L) 3.5 - 5.2 g/dL    Alkaline Phosphatase 115 40 - 129 U/L    ALT 22 5 - 41 U/L    AST 27 <40 U/L    Total

## 2018-12-24 NOTE — CONSULTS
mild left facial droop with asymmetrical nasolabial fold  VIII: Hearing:  intact  XI: Shoulder shrug:  intact  XII: Tongue movement:  normal    Motor Exam:    Drift:  absent  Tone:  normal    Motor exam is symmetrical 5 out of 5 all extremities bilaterally    Sensory:    Touch:    Right Upper Extremity:  normal  Left Upper Extremity:  normal  Right Lower Extremity:  normal  Left Lower Extremity:  normal    Coordination/Dysmetria:  Heel to Shin:  Right:  normal  Left:  normal  Finger to Nose:   Right:  normal  Left:  normal      SKIN:  no rash        INITIAL NIH STROKE SCALE    Time Performed:  1216PM    Administer stroke scale items in the order listed. Record performance in each category after each subscale exam. Do not go back and change scores. Follow directions provided for each exam technique. Scores should reflect what the patient does, not what the clinician thinks the patient can do. The clinician should record answers while administering the exam and work quickly. Except where indicated, the patient should not be coached (i.e., repeated requests to patient to make a special effort). 1a.  Level of consciousness:  0 - alert; keenly responsive  1b. Level of consciousness questions:  0 - answers both questions correctly  1c. Level of consciousness questions:  0 - performs both tasks correctly  2. Best Gaze:  0 - normal  3. Visual:  0 - no visual loss  4. Facial Palsy:  1 - minor paralysis (flattened nasolabial fold, asymmetric on smiling)  5a. Motor left arm:  0 - no drift, limb holds 90 (or 45) degrees for full 10 seconds  5b. Motor right arm:  0 - no drift, limb holds 90 (or 45) degrees for full 10 seconds  6a. Motor left le - no drift; leg holds 30 degree position for full 5 seconds  6b. Motor right le - no drift; leg holds 30 degree position for full 5 seconds  7. Limb Ataxia:  0 - absent  8. Sensory:  0 - normal; no sensory loss  9.     Best Language:  0 - no aphasia,

## 2018-12-24 NOTE — ED NOTES
Patient to CT by Mulugeta Castillo 155, RN  12/24/18 [id number]
Assistance:   []    Fire -    []    Police    [x]    Other EMS (See Below)    [facility] MEDICAL CTR AT [place] Notification:    [facility] -  [x] [address]  [] [facility]  [] [facility]  [] [facility]  [] [facility] [] [facility] [] [facility] [] [facility]  [] [employer]     []    Med Channel:     [x]    Cell Phone: Verbal report given to Shweta Muñoz RN @ [phone]     Med Control Orders Received:   [x] No  []  Yes:     Med Control Physician (if on line medical direction received)  -        Hospital Team Alert:   []    Trauma Alert    []    STEMI Alert         []    Stroke Alert    []    RACE Alert      Description Of Valuables: None    Patient Valuables:   [x]    With Patient    []    Not Received    []    ER / Lab     Call Outcome:   [x]    Transport to Facility    []    Care Transferred to [facility]    []    Cancelled    []    Patient Refusal    []                           Mobile Stroke Unit    Electronically signed by Karri Valdovinos RN on 12/24/18 at 2:04 PM     Karri Valdovinos RN  12/24/18 [id number]

## 2018-12-25 PROBLEM — I26.02 ACUTE SADDLE PULMONARY EMBOLISM WITH ACUTE COR PULMONALE (HCC): Status: ACTIVE | Noted: 2018-12-24

## 2018-12-25 LAB
ABSOLUTE EOS #: 0.08 K/UL (ref 0–0.44)
ABSOLUTE IMMATURE GRANULOCYTE: 0.03 K/UL (ref 0–0.3)
ABSOLUTE LYMPH #: 1.58 K/UL (ref 1.1–3.7)
ABSOLUTE MONO #: 0.57 K/UL (ref 0.1–1.2)
ALBUMIN SERPL-MCNC: 3.4 G/DL (ref 3.5–5.2)
ALBUMIN/GLOBULIN RATIO: 1.3 (ref 1–2.5)
ALP BLD-CCNC: 116 U/L (ref 40–129)
ALT SERPL-CCNC: 23 U/L (ref 5–41)
ANION GAP SERPL CALCULATED.3IONS-SCNC: 14 MMOL/L (ref 9–17)
AST SERPL-CCNC: 24 U/L
BASOPHILS # BLD: 1 % (ref 0–2)
BASOPHILS ABSOLUTE: 0.06 K/UL (ref 0–0.2)
BILIRUB SERPL-MCNC: 0.52 MG/DL (ref 0.3–1.2)
BUN BLDV-MCNC: 14 MG/DL (ref 8–23)
BUN/CREAT BLD: ABNORMAL (ref 9–20)
CALCIUM SERPL-MCNC: 9.1 MG/DL (ref 8.6–10.4)
CHLORIDE BLD-SCNC: 109 MMOL/L (ref 98–107)
CO2: 24 MMOL/L (ref 20–31)
CREAT SERPL-MCNC: 1.03 MG/DL (ref 0.7–1.2)
DIFFERENTIAL TYPE: ABNORMAL
EOSINOPHILS RELATIVE PERCENT: 1 % (ref 1–4)
GFR AFRICAN AMERICAN: >60 ML/MIN
GFR NON-AFRICAN AMERICAN: >60 ML/MIN
GFR SERPL CREATININE-BSD FRML MDRD: ABNORMAL ML/MIN/{1.73_M2}
GFR SERPL CREATININE-BSD FRML MDRD: ABNORMAL ML/MIN/{1.73_M2}
GLUCOSE BLD-MCNC: 95 MG/DL (ref 70–99)
HCT VFR BLD CALC: 41.7 % (ref 40.7–50.3)
HEMOGLOBIN: 13.2 G/DL (ref 13–17)
IMMATURE GRANULOCYTES: 1 %
INR BLD: 1.2
LYMPHOCYTES # BLD: 24 % (ref 24–43)
MCH RBC QN AUTO: 29.4 PG (ref 25.2–33.5)
MCHC RBC AUTO-ENTMCNC: 31.7 G/DL (ref 28.4–34.8)
MCV RBC AUTO: 92.9 FL (ref 82.6–102.9)
MONOCYTES # BLD: 9 % (ref 3–12)
NRBC AUTOMATED: 0 PER 100 WBC
PARTIAL THROMBOPLASTIN TIME: 46.5 SEC (ref 20.5–30.5)
PARTIAL THROMBOPLASTIN TIME: 71.7 SEC (ref 20.5–30.5)
PARTIAL THROMBOPLASTIN TIME: 71.8 SEC (ref 20.5–30.5)
PDW BLD-RTO: 15.9 % (ref 11.8–14.4)
PLATELET # BLD: 123 K/UL (ref 138–453)
PLATELET ESTIMATE: ABNORMAL
PMV BLD AUTO: 13 FL (ref 8.1–13.5)
POTASSIUM SERPL-SCNC: 4.6 MMOL/L (ref 3.7–5.3)
PROTHROMBIN TIME: 12.2 SEC (ref 9–12)
PROTHROMBIN TIME: 12.5 SEC (ref 9–12)
PROTHROMBIN TIME: 12.7 SEC (ref 9–12)
PROTHROMBIN TIME: 12.7 SEC (ref 9–12)
RBC # BLD: 4.49 M/UL (ref 4.21–5.77)
RBC # BLD: ABNORMAL 10*6/UL
SEG NEUTROPHILS: 64 % (ref 36–65)
SEGMENTED NEUTROPHILS ABSOLUTE COUNT: 4.21 K/UL (ref 1.5–8.1)
SODIUM BLD-SCNC: 147 MMOL/L (ref 135–144)
TOTAL PROTEIN: 6 G/DL (ref 6.4–8.3)
TROPONIN INTERP: ABNORMAL
TROPONIN T: ABNORMAL NG/ML
TROPONIN, HIGH SENSITIVITY: 34 NG/L (ref 0–22)
TROPONIN, HIGH SENSITIVITY: 35 NG/L (ref 0–22)
TROPONIN, HIGH SENSITIVITY: 52 NG/L (ref 0–22)
TROPONIN, HIGH SENSITIVITY: 73 NG/L (ref 0–22)
WBC # BLD: 6.5 K/UL (ref 3.5–11.3)
WBC # BLD: ABNORMAL 10*3/UL

## 2018-12-25 PROCEDURE — 36415 COLL VENOUS BLD VENIPUNCTURE: CPT

## 2018-12-25 PROCEDURE — 99291 CRITICAL CARE FIRST HOUR: CPT | Performed by: INTERNAL MEDICINE

## 2018-12-25 PROCEDURE — 80053 COMPREHEN METABOLIC PANEL: CPT

## 2018-12-25 PROCEDURE — 84484 ASSAY OF TROPONIN QUANT: CPT

## 2018-12-25 PROCEDURE — 6360000002 HC RX W HCPCS: Performed by: STUDENT IN AN ORGANIZED HEALTH CARE EDUCATION/TRAINING PROGRAM

## 2018-12-25 PROCEDURE — 99223 1ST HOSP IP/OBS HIGH 75: CPT | Performed by: PSYCHIATRY & NEUROLOGY

## 2018-12-25 PROCEDURE — 85025 COMPLETE CBC W/AUTO DIFF WBC: CPT

## 2018-12-25 PROCEDURE — 6370000000 HC RX 637 (ALT 250 FOR IP): Performed by: EMERGENCY MEDICINE

## 2018-12-25 PROCEDURE — 2580000003 HC RX 258: Performed by: STUDENT IN AN ORGANIZED HEALTH CARE EDUCATION/TRAINING PROGRAM

## 2018-12-25 PROCEDURE — 2000000000 HC ICU R&B

## 2018-12-25 PROCEDURE — 2700000000 HC OXYGEN THERAPY PER DAY

## 2018-12-25 PROCEDURE — 85730 THROMBOPLASTIN TIME PARTIAL: CPT

## 2018-12-25 PROCEDURE — 85610 PROTHROMBIN TIME: CPT

## 2018-12-25 PROCEDURE — 94762 N-INVAS EAR/PLS OXIMTRY CONT: CPT

## 2018-12-25 RX ORDER — ACETAMINOPHEN 325 MG/1
650 TABLET ORAL EVERY 4 HOURS PRN
Status: DISCONTINUED | OUTPATIENT
Start: 2018-12-25 | End: 2018-12-27 | Stop reason: HOSPADM

## 2018-12-25 RX ORDER — TAMSULOSIN HYDROCHLORIDE 0.4 MG/1
0.4 CAPSULE ORAL DAILY
COMMUNITY
End: 2020-02-13 | Stop reason: SDUPTHER

## 2018-12-25 RX ORDER — PROCHLORPERAZINE MALEATE 10 MG
10 TABLET ORAL EVERY 6 HOURS PRN
COMMUNITY

## 2018-12-25 RX ORDER — ONDANSETRON HYDROCHLORIDE 8 MG/1
8 TABLET, FILM COATED ORAL EVERY 8 HOURS PRN
COMMUNITY

## 2018-12-25 RX ADMIN — ONDANSETRON 4 MG: 2 INJECTION INTRAMUSCULAR; INTRAVENOUS at 04:37

## 2018-12-25 RX ADMIN — HEPARIN SODIUM AND DEXTROSE 18 UNITS/KG/HR: 10000; 5 INJECTION INTRAVENOUS at 00:13

## 2018-12-25 RX ADMIN — Medication 10 ML: at 09:07

## 2018-12-25 RX ADMIN — HEPARIN SODIUM 2540 UNITS: 1000 INJECTION, SOLUTION INTRAVENOUS; SUBCUTANEOUS at 13:47

## 2018-12-25 RX ADMIN — Medication 10 ML: at 20:17

## 2018-12-25 RX ADMIN — HEPARIN SODIUM 2540 UNITS: 1000 INJECTION, SOLUTION INTRAVENOUS; SUBCUTANEOUS at 00:12

## 2018-12-25 RX ADMIN — ACETAMINOPHEN 650 MG: 325 TABLET ORAL at 05:06

## 2018-12-25 RX ADMIN — ACETAMINOPHEN 650 MG: 325 TABLET ORAL at 17:11

## 2018-12-25 ASSESSMENT — PAIN SCALES - GENERAL
PAINLEVEL_OUTOF10: 5
PAINLEVEL_OUTOF10: 5
PAINLEVEL_OUTOF10: 0
PAINLEVEL_OUTOF10: 6
PAINLEVEL_OUTOF10: 5

## 2018-12-25 ASSESSMENT — PAIN DESCRIPTION - LOCATION
LOCATION: MOUTH;THROAT
LOCATION: MOUTH;BACK

## 2018-12-25 ASSESSMENT — PAIN DESCRIPTION - PAIN TYPE
TYPE: ACUTE PAIN
TYPE: ACUTE PAIN

## 2018-12-25 NOTE — CONSULTS
[facility] NEUROLOGY CONSULT  NOTE      PATIENT NAME: Armando Key   PATIENT MRN: [id number]   PRIMARY CARE PHYSICIAN: Shanique Davidson MD  CONSULT REQUESTED BY: Consults  DATE OF SERVICE: 12/25/2018    CHIEF COMPLAINT: Emesis and Cerebrovascular Accident       HPI: Armando Key is a 77 y.o. RH WM with PE 10 years ago, recently diagnosed colon cancer on chemo, who presented with TIA like symptoms. Neurology is consulted. Yesterday morning, pt got up around 7AM, around [address], he went to bathroom, when he got up, he felt room spinning, he called his wife, when wife came in, he rested his arms on her shoulders then still severe dizziness (room spining) and noticed left side of her vision deficit, (in left side center cloudy but peripheral clear), he could not tell how long these dizziness and visual deficits lasted, no headache/sob/palpitation/focal or lateralized weakness, sensation changes. Mobile stroke unit did CTH, negative, symptoms rapidly improved, only mild left facial droop with NIHSS 1.     CT chest found acute bilateral pulmonary thromboemboli, pt started on heparin drip. PAST MEDICAL HISTORY:         Diagnosis Date    Back pain     Blood clots in brain     bilateral lower extremities    Broken toe     Colonic mass     Pulmonary emboli (HCC)         PAST SURGICAL HISTORY:         Procedure Laterality Date    COLECTOMY  11/06/2018    COLONOSCOPY          SOCAIL HISTORY:     Social History     Social History    Marital status:      Spouse name: N/A    Number of children: N/A    Years of education: N/A     Occupational History    Not on file.      Social History Main Topics    Smoking status: Never Smoker    Smokeless tobacco: Never Used    Alcohol use No    Drug use: Unknown    Sexual activity: Not on file     Other Topics

## 2018-12-25 NOTE — PROGRESS NOTES
Critical Care Team - Daily Progress Note      Date and time: 12/25/2018 9:04 AM  Patient's name:  Sathish Potts  Medical Record Number: [id number]  Patient's account/billing number: [de-identified]  Patient's YOB: 1950  Age: 77 y.o.   Date of Admission: 12/24/2018 12:05 PM  Length of stay during current admission: 1      Primary Care Physician: Francesco Yarbrough MD    Code Status: Full Code    Reason for ICU admission:   PE      SUBJECTIVE:     OVERNIGHT EVENTS:         Afebrile   Not hypotensive   No bleeding     AWAKE & FOLLOWING COMMANDS:  [] No   [x] Yes    CURRENT VENTILATION STATUS:     [] Ventilator  [] BIPAP  [x] Nasal Cannula [] Room Air      IF INTUBATED, ET TUBE MARKING AT LOWER LIP:       cms    SECRETIONS Amount:  [] Small [] Moderate  [] Large  [] None  Color:     [] White [] Colored  [] Bloody    SEDATION:  RAAS Score:  [] Propofol gtt  [] Versed gtt  [] Ativan gtt   [x] No Sedation    PARALYZED:  [x] No    [] Yes    DIARRHEA:                [x] No                [] Yes  (C. Difficile status: [] positive                                                                                                                       [] negative                                                                                                                     [] pending)    VASOPRESSORS:  [x] No    [] Yes    If yes -   [] Levophed       [] Dopamine     [] Vasopressin       [] Dobutamine  [] Phenylephrine         [] Epinephrine    CENTRAL LINES:     [x] No   [] Yes   (Date of Insertion:   )           If yes -     [] Right IJ     [] Left IJ [] Right Femoral [] Left Femoral                   [] Right Subclavian [] Left Subclavian       TOURE'S CATHETER:   [x] No   [] Yes  (Date of Insertion:   )     URINE OUTPUT:            [x] Good   [] Low              [] Anuric      OBJECTIVE:     VITAL SIGNS:  /84   Pulse 77   Temp 98.4 °F (36.9 °C) (Oral)   Resp 19   Ht 5' 4\" (1.626 m)   Wt 138 lb

## 2018-12-25 NOTE — CONSULTS
Endovascular Neurosurgery Note for  [facility] alert at 10:59 and [facility] Stroke Alert @ 12:14  12/24/2018 11:05 PM  Pt Name: Olga Palacios  MRN: [id number]  [place]: 1950  Date of evaluation: 12/24/2018  Primary Care Physician: Siobhan Kitchen MD    Olga Palacios is a 77 y.o. male who presents with remote history of pe and colon ca on chemo with acute onset dizziness and left homonymous hemianopsia ambulating out of bathroom around 10:25. Exam with ems consistent with left visual cut no other motor deficits. [facility] evaluation with neg head ct and rapidly improving visual deficits. No iv tpa as nih 1 with mild left facial droop. Noted frequent hypoxia on room air en route to [facility] and required non breather + nausea with vomiting requiring Zofran hypotensive     Allergies  has No Known Allergies. Medications  Prior to Admission medications    Medication Sig Start Date End Date Taking? Authorizing Provider   Nicki, Zingiber officinalis, (NICKI PO) Take by mouth daily   Yes Historical Provider, MD   TURMERIC PO Take by mouth daily   Yes Historical Provider, MD   capecitabine (XELODA) 500 MG chemo tablet Take 1,500 mg/m2 by mouth 2 times daily   Yes Historical Provider, MD   Multiple Vitamins-Minerals (THERAPEUTIC MULTIVITAMIN-MINERALS) tablet Take 1 tablet by mouth daily   Yes Historical Provider, MD   vitamin C (ASCORBIC ACID) 500 MG tablet Take 500 mg by mouth daily   Yes Historical Provider, MD    Scheduled Meds:   sodium chloride flush  10 mL Intravenous 2 times per day     Continuous Infusions:   heparin (porcine) 16 Units/kg/hr (12/24/18 1800)    sodium chloride 100 mL/hr at 12/24/18 2125     PRN Meds:.heparin (porcine), heparin (porcine), sodium chloride flush, magnesium hydroxide, ondansetron, benzocaine-menthol  Past Medical History   has a past medical history of Back pain; Blood clots in brain; Broken toe; Colonic mass; and Pulmonary emboli ([address]   Social History  Social deficits  2. Mri brain when able  3. Due to saddle embolus may consider heparin with bolus. Iv tpa if hemodynamically necessary vs thrombectomy.     110  minutes with greater than 50% of time spent face to face, counseling, coordinating care, examining patient, reviewing images and labs personally, and speaking with team.      Monae Gunter MD Pager: [phone]  Stroke, [facility]  [id number] [place]  Electronically signed 12/24/2018 at 11:05 PM

## 2018-12-26 ENCOUNTER — APPOINTMENT (OUTPATIENT)
Dept: MRI IMAGING | Age: 68
DRG: 176 | End: 2018-12-26
Payer: MEDICARE

## 2018-12-26 LAB
ANION GAP SERPL CALCULATED.3IONS-SCNC: 11 MMOL/L (ref 9–17)
ANION GAP: 20 MMOL/L (ref 8–16)
BUN BLDV-MCNC: 13 MG/DL (ref 8–23)
BUN/CREAT BLD: NORMAL (ref 9–20)
CALCIUM SERPL-MCNC: 8.6 MG/DL (ref 8.6–10.4)
CHLORIDE BLD-SCNC: 107 MMOL/L (ref 98–107)
CO2: 25 MMOL/L (ref 20–31)
CREAT SERPL-MCNC: 1.09 MG/DL (ref 0.7–1.2)
GFR AFRICAN AMERICAN: >60 ML/MIN
GFR NON-AFRICAN AMERICAN: >60 ML/MIN
GFR SERPL CREATININE-BSD FRML MDRD: NORMAL ML/MIN/{1.73_M2}
GFR SERPL CREATININE-BSD FRML MDRD: NORMAL ML/MIN/{1.73_M2}
GLUCOSE BLD-MCNC: 145 MG/DL (ref 74–106)
GLUCOSE BLD-MCNC: 95 MG/DL (ref 70–99)
INR BLD: 1.2
PARTIAL THROMBOPLASTIN TIME: 68.4 SEC (ref 20.5–30.5)
PLATELET # BLD: 112 K/UL (ref 138–453)
POC BUN: 16 MG/DL (ref 6–20)
POC CHLORIDE: 103 MMOL/L (ref 98–110)
POC CREATININE: 1 MG/DL (ref 0.6–1.4)
POC HEMATOCRIT: 43 % (ref 41–53)
POC HEMOGLOBIN: 14.6 G/DL (ref 13.5–17.5)
POC IONIZED CALCIUM: 1.26 MMOL/L (ref 1.13–1.33)
POC POTASSIUM: 3.6 MMOL/L (ref 3.5–5.1)
POC SODIUM: 143 MMOL/L (ref 136–145)
POC TCO2: 25 MMOL/L (ref 20–31)
POTASSIUM SERPL-SCNC: 4 MMOL/L (ref 3.7–5.3)
PROTHROMBIN TIME: 12.4 SEC (ref 9–12)
SODIUM BLD-SCNC: 143 MMOL/L (ref 135–144)
TROPONIN INTERP: ABNORMAL
TROPONIN T: ABNORMAL NG/ML
TROPONIN, HIGH SENSITIVITY: 29 NG/L (ref 0–22)

## 2018-12-26 PROCEDURE — 85049 AUTOMATED PLATELET COUNT: CPT

## 2018-12-26 PROCEDURE — 85730 THROMBOPLASTIN TIME PARTIAL: CPT

## 2018-12-26 PROCEDURE — A9576 INJ PROHANCE MULTIPACK: HCPCS | Performed by: PSYCHIATRY & NEUROLOGY

## 2018-12-26 PROCEDURE — 99233 SBSQ HOSP IP/OBS HIGH 50: CPT | Performed by: INTERNAL MEDICINE

## 2018-12-26 PROCEDURE — 1200000000 HC SEMI PRIVATE

## 2018-12-26 PROCEDURE — 84484 ASSAY OF TROPONIN QUANT: CPT

## 2018-12-26 PROCEDURE — 70553 MRI BRAIN STEM W/O & W/DYE: CPT

## 2018-12-26 PROCEDURE — 2700000000 HC OXYGEN THERAPY PER DAY

## 2018-12-26 PROCEDURE — 85610 PROTHROMBIN TIME: CPT

## 2018-12-26 PROCEDURE — 80048 BASIC METABOLIC PNL TOTAL CA: CPT

## 2018-12-26 PROCEDURE — 6370000000 HC RX 637 (ALT 250 FOR IP): Performed by: EMERGENCY MEDICINE

## 2018-12-26 PROCEDURE — 6360000002 HC RX W HCPCS: Performed by: HOSPITALIST

## 2018-12-26 PROCEDURE — 6370000000 HC RX 637 (ALT 250 FOR IP): Performed by: HOSPITALIST

## 2018-12-26 PROCEDURE — 2580000003 HC RX 258: Performed by: STUDENT IN AN ORGANIZED HEALTH CARE EDUCATION/TRAINING PROGRAM

## 2018-12-26 PROCEDURE — 6360000004 HC RX CONTRAST MEDICATION: Performed by: PSYCHIATRY & NEUROLOGY

## 2018-12-26 PROCEDURE — 36415 COLL VENOUS BLD VENIPUNCTURE: CPT

## 2018-12-26 PROCEDURE — 99232 SBSQ HOSP IP/OBS MODERATE 35: CPT | Performed by: PSYCHIATRY & NEUROLOGY

## 2018-12-26 PROCEDURE — 94762 N-INVAS EAR/PLS OXIMTRY CONT: CPT

## 2018-12-26 RX ORDER — TAMSULOSIN HYDROCHLORIDE 0.4 MG/1
0.4 CAPSULE ORAL DAILY
Status: DISCONTINUED | OUTPATIENT
Start: 2018-12-27 | End: 2018-12-27

## 2018-12-26 RX ORDER — SODIUM CHLORIDE 0.9 % (FLUSH) 0.9 %
10 SYRINGE (ML) INJECTION PRN
Status: DISCONTINUED | OUTPATIENT
Start: 2018-12-26 | End: 2018-12-27 | Stop reason: HOSPADM

## 2018-12-26 RX ADMIN — Medication 10 ML: at 08:37

## 2018-12-26 RX ADMIN — ENOXAPARIN SODIUM 60 MG: 60 INJECTION SUBCUTANEOUS at 20:55

## 2018-12-26 RX ADMIN — ENOXAPARIN SODIUM 60 MG: 60 INJECTION SUBCUTANEOUS at 11:51

## 2018-12-26 RX ADMIN — HYPROMELLOSE 2906 (4000 MPA.S) 1 DROP: 25 SOLUTION OPHTHALMIC at 04:24

## 2018-12-26 RX ADMIN — ACETAMINOPHEN 650 MG: 325 TABLET ORAL at 04:31

## 2018-12-26 RX ADMIN — Medication 10 ML: at 20:55

## 2018-12-26 RX ADMIN — GADOTERIDOL 11 ML: 279.3 INJECTION, SOLUTION INTRAVENOUS at 10:32

## 2018-12-26 RX ADMIN — HYPROMELLOSE 2906 (4000 MPA.S) 1 DROP: 25 SOLUTION OPHTHALMIC at 20:07

## 2018-12-26 ASSESSMENT — PAIN SCALES - GENERAL
PAINLEVEL_OUTOF10: 0
PAINLEVEL_OUTOF10: 2
PAINLEVEL_OUTOF10: 0
PAINLEVEL_OUTOF10: 2

## 2018-12-26 ASSESSMENT — PAIN DESCRIPTION - PAIN TYPE: TYPE: ACUTE PAIN

## 2018-12-26 ASSESSMENT — PAIN DESCRIPTION - ORIENTATION: ORIENTATION: LOWER;MID

## 2018-12-26 ASSESSMENT — PAIN DESCRIPTION - LOCATION: LOCATION: BACK

## 2018-12-26 NOTE — PROGRESS NOTES
15.9*   --    PLT  See Reflexed IPF Result  123*  112*   MPV  NOT REPORTED  13.0   --         Last 3 Blood Glucose:   Recent Labs      12/24/18   1221  12/25/18   0458  12/26/18   0439   GLUCOSE  143*  95  95        PT/INR:    Lab Results   Component Value Date    PROTIME 12.4 12/26/2018    INR 1.2 12/26/2018     PTT:    Lab Results   Component Value Date    APTT 68.4 12/26/2018       Comprehensive Metabolic Profile:   Recent Labs      12/24/18   1221  12/25/18   0458  12/26/18   0439   NA  141  147*  143   K  4.1  4.6  4.0   CL  105  109*  107   CO2  23  24  25   BUN  15  14  13   CREATININE  0.92  1.03  1.09   GLUCOSE  143*  95  95   CALCIUM  8.8  9.1  8.6   PROT  5.9*  6.0*   --    LABALBU  3.4*  3.4*   --    BILITOT  0.52  0.52   --    ALKPHOS  115  116   --    AST  27  24   --    ALT  22  23   --       Magnesium:   Lab Results   Component Value Date    MG 2.2 12/24/2018     Phosphorus:   Lab Results   Component Value Date    PHOS 3.8 12/24/2018     Ionized Calcium: No results found for: CAION     Urinalysis: Lab Results   Component Value Date    NITRU NEGATIVE 12/24/2018    COLORU YELLOW 12/24/2018    PHUR 7.5 12/24/2018    WBCUA 0 TO 2 11/30/2018    RBCUA 0 TO 2 11/30/2018    MUCUS NOT REPORTED 11/30/2018    TRICHOMONAS NOT REPORTED 11/30/2018    YEAST NOT REPORTED 11/30/2018    BACTERIA NOT REPORTED 11/30/2018    SPECGRAV 1.043 12/24/2018    LEUKOCYTESUR NEGATIVE 12/24/2018    UROBILINOGEN Normal 12/24/2018    BILIRUBINUR NEGATIVE 12/24/2018    GLUCOSEU NEGATIVE 12/24/2018    KETUA NEGATIVE 12/24/2018    AMORPHOUS NOT REPORTED 11/30/2018       HgBA1c:  No results found for: LABA1C  TSH:  No results found for: TSH  Lactic Acid: No results found for: LACTA   Troponin: No results for input(s): TROPONINI in the last 72 hours.     ASSESSMENT:     Patient Active Problem List    Diagnosis Date Noted    Acute saddle pulmonary embolism with acute cor pulmonale ([address] 12/24/2018    BPH with obstruction/lower urinary

## 2018-12-26 NOTE — CONSULTS
at 11:30. Ct Chest Pulmonary Embolism W Contrast    Result Date: 12/24/2018  EXAMINATION: CTA OF THE CHEST 12/24/2018 12:47 pm TECHNIQUE: CTA of the chest was performed after the administration of intravenous contrast.  Multiplanar reformatted images are provided for review. MIP images are provided for review. Dose modulation, iterative reconstruction, and/or weight based adjustment of the mA/kV was utilized to reduce the radiation dose to as low as reasonably achievable. COMPARISON: 05/08/2008 HISTORY: ORDERING SYSTEM PROVIDED HISTORY: SHORTNESS OF BREATH FINDINGS: Pulmonary Arteries: Motion artifact degrades image quality. However, there are multiple filling defects throughout the bilateral main pulmonary arteries extending into the segmental pulmonary arteries consistent with acute pulmonary thromboemboli. This is causing right ventricular heart strain. The RV: LV ratio is 1.6. Mediastinum: There are no enlarged thoracic lymph nodes. Lungs/pleura: The airway is patent. There is no pneumothorax or pleural effusion. There is bibasilar atelectasis. Tree-in-bud opacities are present in the left upper lobe favoring bronchiolitis. Upper Abdomen: There is a moderate to large hiatal hernia. There are bilateral simple hepatic cysts. However, within the right hepatic lobe, there are multiple ill-defined, nonspecific low attenuating lesions which cannot be evaluated due the timing of the contrast bolus. The largest lesion is within the right hepatic lobe measuring 4.0 x 4.7 cm. Soft Tissues/Bones: Degenerative changes involve the thoracic spine. 1. Acute bilateral pulmonary thromboemboli causing right ventricular heart strain. 2. Left upper lobe tree-in-bud opacities favoring bronchiolitis rather than endobronchial metastasis. Recommend CT of the chest in 3 months to confirm resolution.  3. Indeterminate right hepatic lesions concerning for metastatic disease; correlate with nonemergent MRI of the abdomen modulation, iterative reconstruction, and/or weight based adjustment of the mA/kV was utilized to reduce the radiation dose to as low as reasonably achievable. COMPARISON: None. HISTORY: ORDERING SYSTEM PROVIDED HISTORY: CVA symptoms TECHNOLOGIST PROVIDED HISTORY: FINDINGS: CTA NECK: AORTIC ARCH/ARCH VESSELS: There is a normal branch pattern of the aortic arch. No significant stenosis is seen of the innominate artery or subclavian arteries. CAROTID ARTERIES: The common carotid arteries are normal in appearance without evidence of a flow limiting stenosis. The internal carotid arteries are normal in appearance without evidence of a flow limiting stenosis by NASCET criteria. No dissection or arterial injury is seen. VERTEBRAL ARTERIES: The vertebral arteries both arise from the subclavian arteries and are normal in caliber without evidence of flow limiting stenosis or dissection. SOFT TISSUES: There is partial visualization of a few ill-defined nodular foci within the left upper lobe. There is a pulmonary embolism within the segmental branch supplying the left upper lobe anteriorly. No cervical or superior mediastinal lymphadenopathy. The visualized portion of the larynx and pharynx appear unremarkable. The parotid, submandibular and thyroid glands demonstrate no acute abnormality. BONES: The visualized osseous structures appear unremarkable. CTA HEAD: ANTERIOR CIRCULATION: The internal carotid arteries are normal in course and caliber without focal stenosis. The anterior cerebral and middle cerebral arteries demonstrate no focal stenosis. POSTERIOR CIRCULATION: The posterior cerebral arteries demonstrate no focal stenosis. The vertebral and basilar arteries appear unremarkable. BRAIN: No mass effect or midline shift. No abnormal extra-axial fluid collection. The gray-white differentiation appears grossly maintained. Unremarkable CTA of the head and neck without significant flow limiting stenosis or occlusion.

## 2018-12-27 VITALS
BODY MASS INDEX: 23.9 KG/M2 | TEMPERATURE: 98.1 F | HEART RATE: 92 BPM | WEIGHT: 139.99 LBS | DIASTOLIC BLOOD PRESSURE: 78 MMHG | HEIGHT: 64 IN | OXYGEN SATURATION: 95 % | RESPIRATION RATE: 18 BRPM | SYSTOLIC BLOOD PRESSURE: 121 MMHG

## 2018-12-27 LAB
ABSOLUTE EOS #: 0.26 K/UL (ref 0–0.44)
ABSOLUTE IMMATURE GRANULOCYTE: <0.03 K/UL (ref 0–0.3)
ABSOLUTE LYMPH #: 1.43 K/UL (ref 1.1–3.7)
ABSOLUTE MONO #: 0.46 K/UL (ref 0.1–1.2)
ANION GAP SERPL CALCULATED.3IONS-SCNC: 17 MMOL/L (ref 9–17)
BASOPHILS # BLD: 1 % (ref 0–2)
BASOPHILS ABSOLUTE: 0.05 K/UL (ref 0–0.2)
BUN BLDV-MCNC: 14 MG/DL (ref 8–23)
BUN/CREAT BLD: ABNORMAL (ref 9–20)
CALCIUM SERPL-MCNC: 8.2 MG/DL (ref 8.6–10.4)
CHLORIDE BLD-SCNC: 106 MMOL/L (ref 98–107)
CO2: 23 MMOL/L (ref 20–31)
CREAT SERPL-MCNC: 0.81 MG/DL (ref 0.7–1.2)
DIFFERENTIAL TYPE: ABNORMAL
EOSINOPHILS RELATIVE PERCENT: 6 % (ref 1–4)
GFR AFRICAN AMERICAN: >60 ML/MIN
GFR NON-AFRICAN AMERICAN: >60 ML/MIN
GFR SERPL CREATININE-BSD FRML MDRD: ABNORMAL ML/MIN/{1.73_M2}
GFR SERPL CREATININE-BSD FRML MDRD: ABNORMAL ML/MIN/{1.73_M2}
GLUCOSE BLD-MCNC: 88 MG/DL (ref 70–99)
HCT VFR BLD CALC: 37.9 % (ref 40.7–50.3)
HEMOGLOBIN: 12.3 G/DL (ref 13–17)
IMMATURE GRANULOCYTES: 0 %
LYMPHOCYTES # BLD: 33 % (ref 24–43)
MCH RBC QN AUTO: 30 PG (ref 25.2–33.5)
MCHC RBC AUTO-ENTMCNC: 32.5 G/DL (ref 28.4–34.8)
MCV RBC AUTO: 92.4 FL (ref 82.6–102.9)
MONOCYTES # BLD: 11 % (ref 3–12)
NRBC AUTOMATED: 0 PER 100 WBC
PDW BLD-RTO: 15.9 % (ref 11.8–14.4)
PLATELET # BLD: 106 K/UL (ref 138–453)
PLATELET ESTIMATE: ABNORMAL
PMV BLD AUTO: 12.2 FL (ref 8.1–13.5)
POTASSIUM SERPL-SCNC: 3.7 MMOL/L (ref 3.7–5.3)
RBC # BLD: 4.1 M/UL (ref 4.21–5.77)
RBC # BLD: ABNORMAL 10*6/UL
SEG NEUTROPHILS: 49 % (ref 36–65)
SEGMENTED NEUTROPHILS ABSOLUTE COUNT: 2.09 K/UL (ref 1.5–8.1)
SODIUM BLD-SCNC: 146 MMOL/L (ref 135–144)
WBC # BLD: 4.3 K/UL (ref 3.5–11.3)
WBC # BLD: ABNORMAL 10*3/UL

## 2018-12-27 PROCEDURE — 80048 BASIC METABOLIC PNL TOTAL CA: CPT

## 2018-12-27 PROCEDURE — 36415 COLL VENOUS BLD VENIPUNCTURE: CPT

## 2018-12-27 PROCEDURE — 85025 COMPLETE CBC W/AUTO DIFF WBC: CPT

## 2018-12-27 PROCEDURE — 99232 SBSQ HOSP IP/OBS MODERATE 35: CPT | Performed by: PSYCHIATRY & NEUROLOGY

## 2018-12-27 PROCEDURE — 2580000003 HC RX 258: Performed by: STUDENT IN AN ORGANIZED HEALTH CARE EDUCATION/TRAINING PROGRAM

## 2018-12-27 PROCEDURE — 99233 SBSQ HOSP IP/OBS HIGH 50: CPT | Performed by: INTERNAL MEDICINE

## 2018-12-27 PROCEDURE — 99232 SBSQ HOSP IP/OBS MODERATE 35: CPT | Performed by: INTERNAL MEDICINE

## 2018-12-27 PROCEDURE — 6360000002 HC RX W HCPCS: Performed by: HOSPITALIST

## 2018-12-27 RX ADMIN — Medication 10 ML: at 08:02

## 2018-12-27 RX ADMIN — HYPROMELLOSE 2906 (4000 MPA.S) 1 DROP: 25 SOLUTION OPHTHALMIC at 08:05

## 2018-12-27 RX ADMIN — ENOXAPARIN SODIUM 60 MG: 60 INJECTION SUBCUTANEOUS at 08:02

## 2018-12-27 ASSESSMENT — ENCOUNTER SYMPTOMS
NAUSEA: 0
SINUS PRESSURE: 0
PHOTOPHOBIA: 0
ABDOMINAL PAIN: 0
SINUS PAIN: 0
DIARRHEA: 0
EYE REDNESS: 0
COUGH: 0
SHORTNESS OF BREATH: 0

## 2018-12-27 NOTE — PROGRESS NOTES
ICU PATIENT TRANSFER NOTE        Patient:  Angelica Mckeon  YOB: 1950    MRN: [id number]     Acct: [de-identified]     Admit date: 12/24/2018    Code Status:-  Full    Reason for ICU Admission:-   Massive pulmonary embolism    SUPPORT DEVICES: [] Ventilator [] BIPAP  [] Nasal Cannula [x] Room Air    Consultations:- [] Cardiology [] Nephrology  [] Hemo onco  [] GI                               [] ID [] ENT  [] Rheum [] Endo   []Physiotherapy                                 Others:- Neurology, Vascular, Stroke     NUTRITION:  [] NPO [] Tube Feeding (Specify: ) [] TPN  [x] PO    Central Lines:- [x] No   [] Yes           If yes - Days/Date of Insertion         Pt seen and Chart reviewed. ICU COURSE :-    A 77 y.o male with recently diagnosed (11/9/2018) colorectal CA s/p anterior resection and chemotherapy, and a remote history of PE 10 years prior for which he took anticoagulant injections who presented with sudden onset dizziness and partial vision loss while having a bath on 12/24/18. He does not remember being short of breath and denied chest pain. Per chart at presentation, he was dyspneic, tachypneic with sPO2 in 70s and borderline normal BP. Neurology consulted for possible stroke but did not think patient was a candidate for TPA due to resolution of  Neurologic symptoms. On evaluation for shortness of breath, CT PE showed saddle pulmonary embolism. Patient however was noted to be hemodynamically stable and was placed heparin drip. With close monitoring of his vital signs. Patient has since remained hemodynamically stable and has been transferred to the med-surg floors.       Physical Exam:  Vitals: /89   Pulse 75   Temp 97.5 °F (36.4 °C) (Oral)   Resp 22   Ht 5' 4\" (1.626 m)   Wt 139 lb 15.9 oz (63.5 kg)   SpO2 95%   BMI 24.03 kg/m²   24 hour intake/output:  Intake/Output Summary (Last 24 hours) at 12/26/18 2076  Last data filed 72 hours. Glucose:  Recent Labs      12/24/18   1133  12/24/18   1217   POCGLU  145*  137*     HgbA1C: No results for input(s): LABA1C in the last 72 hours. INR:   Recent Labs      12/26/18   0439   INR  1.2     Hepatic: Recent Labs      12/24/18   1221  12/25/18   0458   ALKPHOS  115  116   ALT  22  23   AST  27  24   PROT  5.9*  6.0*   BILITOT  0.52  0.52   BILIDIR  0.15   --    LABALBU  3.4*  3.4*     Amylase and Lipase:No results for input(s): LACTA, AMYLASE in the last 72 hours. Lactic Acid: No results for input(s): LACTA in the last 72 hours. CARDIAC ENZYMES:No results for input(s): CKTOTAL, CKMB, CKMBINDEX, TROPONINI in the last 72 hours. BNP: No results for input(s): BNP in the last 72 hours. Lipids: No results for input(s): CHOL, TRIG, HDL, LDLCALC in the last 72 hours. Invalid input(s): LDL  ABGs: No results found for: PH, PCO2, PO2, HCO3, O2SAT  Thyroid: No results found for: TSH   Urinalysis: Recent Labs      12/24/18   1335   COLORU  YELLOW   PHUR  7.5   PROTEINU  NEGATIVE   SPECGRAV  1.043*   BILIRUBINUR  NEGATIVE   NITRU  NEGATIVE   LEUKOCYTESUR  NEGATIVE   GLUCOSEU  NEGATIVE             EKG: normal sinus rhythm. ECHO: pending          Assessment:  Patient Active Problem List   Diagnosis    Urinary retention    BPH with obstruction/lower urinary tract symptoms    Acute saddle pulmonary embolism with acute cor pulmonale ([facility] [address]         Plan:  1. Saddle pulmonary embolism. On therapeutic lovenox 60 mg BID (started today). Continue nasal cannula 02. HemOnc consult for anticoagulant recommendations. 2. TIA. Resolved. 3. Colorectal cancer s/p anterior resection. Due for another cycle of Chemotherapy on 12/28/18. 4. Urinary retention.  Continue flomax 0.4 mg daily    Markel Thompson MD             12/26/2018, 11:11 PM

## 2018-12-27 NOTE — CARE COORDINATION
Waiting on home anticoag rec's from Hem/Onc. Will need to check insurance coverage. 1155 spoke with Zak at [employer] in [facility]. He has Lovenox Rx and will call CM with copay. 1200 spoke with pt and his wife. Pt has given self Lovenox injections before. He is comfortable doing so at home. Updated that CM was waiting on [facility] to check cost.    9583 spoke with Cori Corona at [employer] She states insurance will only cover 1/day. Cost $359. Pt's insurance information is  [employer]  ID # [id number]  [phone]  She states eye drop is not covered by insurance-likely an OTC med. Pt updated-states that he was planning on going to eye doctor and will not need to fill that at this time. Called [employer] [phone] for prior auth of bid Lovenox. Completed via phone-requested expedited processing. Can still take up to 24 hours. Will fax determination to [phone]. Ref # [id number].    0722 approval fax from [facility] for Lovenox. Called [facility]-cost is $591. The do not have in stock. Spoke with pt/wife. They would like [facility] in [facility]. Called [facility]-he states they have 14 syringes in stock. He will call and get it transferred. 1430 spoke with Tierra at [address] [facility]. They have Rx but will need to call [facility] back and get insurance info. Pt updated to  Rx at [facility]. Will schedule own appt with PCP.     Discharge [address] [place] [address] Case Management Department  Written by: Zakia Benedict RN    Patient Name: Jovani Borrego  Attending Provider: Christian Wu MD  Admit Date: 2018 12:05 PM  MRN: [id number]  Account: [de-identified]                     : 1950  Discharge Date:       Disposition: home    Zakia Benedict RN

## 2018-12-27 NOTE — PROGRESS NOTES
[] None drawn      [] Negative             []  Positive (Details:  )  Urine Culture:                   [] None drawn      [] Negative             []  Positive (Details:  )  Sputum Culture:               [] None drawn       [] Negative             []  Positive (Details:  )   Endotracheal aspirate:     [] None drawn       [] Negative             []  Positive (Details:  )       Consults:     1. vasc sx  2. Neuro     Assessment:     Active Problems:    Acute saddle pulmonary embolism with acute cor pulmonale (HCC)  Resolved Problems:    * No resolved hospital problems. *        Recommended Follow-up:     1. lovenox  2. Hem onc opinion for OP AC   3. Monitor vitals and for signs of bleeding   4. Neuro recs        Above mentioned assessment and plan was discussed by me with the admitting medicine resident. The medicine team assigned to the patient by medicine admitting resident will be following up the patient from now onwards on the floor. Marily Noriega M.D.   Critical care resident,  Department of Internal Medicine/ Critical care  [facility])             12/26/2018, 8:47 PM

## 2018-12-27 NOTE — PROGRESS NOTES
105  109*  107  106   CO2  23  24  25  23   BUN  15  14  13  14   CREATININE  0.92  1.03  1.09  0.81   GLUCOSE  143*  95  95  88   CALCIUM  8.8  9.1  8.6  8.2*   PROT  5.9*  6.0*   --    --    LABALBU  3.4*  3.4*   --    --    BILITOT  0.52  0.52   --    --    ALKPHOS  115  116   --    --    AST  27  24   --    --    ALT  22  23   --    --       Magnesium:   Lab Results   Component Value Date    MG 2.2 12/24/2018     Phosphorus:   Lab Results   Component Value Date    PHOS 3.8 12/24/2018     Ionized Calcium: No results found for: CAION     Urinalysis:   Lab Results   Component Value Date    NITRU NEGATIVE 12/24/2018    COLORU YELLOW 12/24/2018    PHUR 7.5 12/24/2018    WBCUA 0 TO 2 11/30/2018    RBCUA 0 TO 2 11/30/2018    MUCUS NOT REPORTED 11/30/2018    TRICHOMONAS NOT REPORTED 11/30/2018    YEAST NOT REPORTED 11/30/2018    BACTERIA NOT REPORTED 11/30/2018    SPECGRAV 1.043 12/24/2018    LEUKOCYTESUR NEGATIVE 12/24/2018    UROBILINOGEN Normal 12/24/2018    BILIRUBINUR NEGATIVE 12/24/2018    GLUCOSEU NEGATIVE 12/24/2018    KETUA NEGATIVE 12/24/2018    AMORPHOUS NOT REPORTED 11/30/2018       HgBA1c:  No results found for: LABA1C  TSH:  No results found for: TSH  Lactic Acid: No results found for: LACTA   Troponin: No results for input(s): TROPONINI in the last 72 hours.     ASSESSMENT:     Patient Active Problem List    Diagnosis Date Noted    Acute saddle pulmonary embolism with acute cor pulmonale ([address] 12/24/2018    BPH with obstruction/lower urinary tract symptoms 12/10/2018    Urinary retention 11/17/2018      Rectal cancer       Plan   lovenox 1 mg /kg bid     DOMINIC BOOTHE MD    12/27/2018 9:42 AM

## 2018-12-27 NOTE — PROGRESS NOTES
[facility]            [facility], [address]; [facility], [address] [facility]    [address] [facility], [address], [place], [address] [facility]            Dept: [phone]          Dept Fax: [phone]             MD Mariama Alcocer MD Ahmed B. Jolanda Livers, MD Torrance Mis, MD Donnel Grayer, MD Dani Bienenstock, CNP                NEUROLOGY PROGRESS NOTE    PATIENT NAME: Geoff Manning   PATIENT MRN: [id number]   PRIMARY CARE PHYSICIAN: Nhan Melara MD  DATE OF SERVICE: 12/27/2018    SUBJECTIVE:    Pt has been doing well, no issues, no new stroke like symptoms. MRI brain done, which was negative for stroke. Pt switched to lovenox since yesterday, tolerates well.      PAST MEDICAL HISTORY:         Diagnosis Date    Back pain     Blood clots in brain     bilateral lower extremities    Broken toe     Colonic mass     Pulmonary emboli (HCC)        MEDICATIONS:     Current Facility-Administered Medications   Medication Dose Route Frequency Provider Last Rate Last Dose    sodium chloride flush 0.9 % injection 10 mL  10 mL Intravenous PRN Navneet Guzman MD        enoxaparin (LOVENOX) injection 60 mg  1 mg/kg Subcutaneous BID Jose Bunch MD   60 mg at 12/27/18 0802    acetaminophen (TYLENOL) tablet 650 mg  650 mg Oral Q4H PRN Dionisio Amaral MD   650 mg at 12/26/18 0431    hydroxypropyl methylcellulose (GONIOSOL) 2.5 % ophthalmic solution 1 drop  1 drop Both Eyes PRN Jose Bunch MD   1 drop at 12/27/18 0805    sodium chloride flush 0.9 % injection 10 mL  10 mL Intravenous 2 times per day Ina Rosen MD   10 mL at 12/27/18 0802    sodium chloride flush 0.9 % injection 10 mL  10 mL Intravenous PRN Ina Rosen MD        magnesium hydroxide (MILK OF MAGNESIA) 400 MG/5ML suspension 30 mL  30 mL Oral Daily PRN Ina Rosen MD  ondansetron (ZOFRAN) injection 4 mg  4 mg Intravenous Q6H PRN Ramon Burnham MD   4 mg at 12/25/18 0437    benzocaine-menthol (CEPACOL SORE THROAT) lozenge 1 lozenge  1 lozenge Oral Q2H PRN Romeo Breen MD   1 lozenge at 12/24/18 6229        ALLERGIES:   No Known Allergies    REVIEW OF SYSTEMS:        CONSTITUTIONAL Weight change: absent, Appetite change: absent, Fatigue: absent    HEENT Ears: normal, Visual disturbance: absent    RESPIRATORY Shortness of breath: absent, Cough: absent    CARDIOVASCULAR Chest pain: absent, Leg swelling :absent    GI Constipation: absent, Diarrhea: absent, Swallowing change: absent     Urinary frequency: absent, Urinary urgency: absent, Urinary incontinence: absent    MUSCULOSKELETAL Neck pain: absent, Back pain: absent, Stiffness: absent, Muscle pain: absent, Joint pain: absent Restless legs: absent    DERMATOLOGIC Hair loss: absent, Skin changes: absent    NEUROLOGIC Memory loss: absent, Confusion: absent, Seizures: absent Trouble walking or imbalance: absent, Dizziness: absent, Weakness: absent, Numbness: absent Tremor: absent, Spasm: absent, Speech difficulty: absent, Headache: absent, Light sensitivity: absent    PSYCHIATRIC Anxiety: absent, Hallucination: absent, Mood disorder: absent    HEMATOLOGIC Abnormal bleeding: present, Anemia: present, Clotting disorder: present, Lymph gland changes: absent     VITALS  /78   Pulse 92   Temp 98.1 °F (36.7 °C) (Oral)   Resp 18   Ht 5' 4\" (1.626 m)   Wt 139 lb 15.9 oz (63.5 kg)   SpO2 95%   BMI 24.03 kg/m²      PHYSICAL EXAMINATION:     General appearance: cooperative  Skin: no rash or skin lesions. HEENT: normocephalic  Optic Fundi: deferred  Neck: supple, no cervcical adenopathy or carotid bruit  Lungs: clear to auscultation  Heart: Regular rate and rhythm, normal S1, S2. No murmurs, clicks or gallops.   Peripheral pulses: radial pulses palpable  Abdominal: BS present, soft, NT, ND  Extremities: no

## 2019-01-05 NOTE — DISCHARGE SUMMARY
[address]   Department of Internal Medicine - Staff Internal Medicine Service    INPATIENT DISCHARGE SUMMARY      PATIENT IDENTIFICATION:  NAME: Brian Atkins : 1950 MRN: [id number]  ACCT: [de-identified]     Admit Date: 2018  Discharge date: 2018  4:38 PM     Attending Provider: Selena att. providers found                                     Dictating Provider: Kenneth Mcknight MD  ______________________________________________________________________________    REASON FOR HOSPITALIZATION:     Active Problems:    Acute saddle pulmonary embolism with acute cor pulmonale (HCC)    Stroke-like symptoms    Malignant neoplasm of colon ([address]  Resolved Problems:    * No resolved hospital problems. *        HOSPITAL COURSE AND TREATMENT:  77 y.o gentleman with significant past medical history of colorectal CA s/p anterior resection and chemotherapy and PE in  who presented with sudden onset dizziness and partial vision loss associated with SOB. Patient was tachypneic with sPO2 in 70s and borderline normal Bp. CT PE showed bilateral massive PE. Patient placed on high dose heparin drip alone due to hemodynamic stability. Stroke team consulted but neurologic symptoms resolved. Patient initially managed in ICU but was transferred out. He was switched to therapeutic lovenox 1 mg/kg BID and was discharged in stable condition. Consults: Pulmonology, Stroke, Vascular surgery, Neurology.     Procedures:  none    Any Hospital Acquired Infections: none      PATIENT'S DISCHARGE CONDITION:      Stable      DISCHARGE MEDICATIONS    Discharge Medication List as of 2018  4:10 PM      START taking these medications    Details   hydroxypropyl methylcellulose (GONIOSOL) 2.5 % ophthalmic solution Place 1 drop into both eyes as needed for Dry Eyes, Disp-1 Bottle, R-2Normal         CONTINUE these medications which have CHANGED    Details   enoxaparin (LOVENOX) 60 MG/0.6ML injection Inject

## 2020-02-13 ENCOUNTER — TELEPHONE (OUTPATIENT)
Dept: UROLOGY | Age: 70
End: 2020-02-13

## 2020-02-13 RX ORDER — ALFUZOSIN HYDROCHLORIDE 10 MG/1
10 TABLET, EXTENDED RELEASE ORAL DAILY
Qty: 30 TABLET | Refills: 3 | Status: SHIPPED | OUTPATIENT
Start: 2020-02-13 | End: 2020-03-02

## 2020-02-13 NOTE — TELEPHONE ENCOUNTER
Called and spoke with patient's wife. Patient is not on Flomax. He is on Xarelto and chemo medication. Wife states he has blood in urine and sometimes blood drainage from around catheter.   Send RX to [facility] in [facility]

## 2020-02-17 ENCOUNTER — OFFICE VISIT (OUTPATIENT)
Dept: UROLOGY | Age: 70
End: 2020-02-17
Payer: MEDICARE

## 2020-02-17 VITALS
TEMPERATURE: 97.7 F | SYSTOLIC BLOOD PRESSURE: 100 MMHG | DIASTOLIC BLOOD PRESSURE: 70 MMHG | WEIGHT: 153 LBS | BODY MASS INDEX: 26.26 KG/M2

## 2020-02-17 PROCEDURE — 4040F PNEUMOC VAC/ADMIN/RCVD: CPT | Performed by: UROLOGY

## 2020-02-17 PROCEDURE — 99214 OFFICE O/P EST MOD 30 MIN: CPT | Performed by: UROLOGY

## 2020-02-17 PROCEDURE — G8484 FLU IMMUNIZE NO ADMIN: HCPCS | Performed by: UROLOGY

## 2020-02-17 PROCEDURE — 3017F COLORECTAL CA SCREEN DOC REV: CPT | Performed by: UROLOGY

## 2020-02-17 PROCEDURE — 1036F TOBACCO NON-USER: CPT | Performed by: UROLOGY

## 2020-02-17 PROCEDURE — G8427 DOCREV CUR MEDS BY ELIG CLIN: HCPCS | Performed by: UROLOGY

## 2020-02-17 PROCEDURE — 1123F ACP DISCUSS/DSCN MKR DOCD: CPT | Performed by: UROLOGY

## 2020-02-17 PROCEDURE — 51798 US URINE CAPACITY MEASURE: CPT | Performed by: UROLOGY

## 2020-02-17 PROCEDURE — G8417 CALC BMI ABV UP PARAM F/U: HCPCS | Performed by: UROLOGY

## 2020-02-17 NOTE — PROGRESS NOTES
HPI:    Patient is a 68 y.o. male in no acute distress. He is alert and oriented to person, place, and time. History  11/2018 Referral from Dr. Umm Nunez for urinary retention. On 11/6/2018 patient did undergo a colon resection due to colon cancer.  Postoperatively patient was unable to urinate and did have a Iverson catheter placed. Nasir Zarate to surgery patient denies baseline lower urinary tract symptoms.  He denies prior issues with retention. Katalina Karimi denies history of stones or gross hematuria. Katalina Kairmi denies history of prostate cancer. Katalina Karimi has never seen urology in the past. Katalina Karimi is struggling with constipation since surgery.     Currently  Patient is here today after having Iverson catheter placed. Patient did receive chemotherapy for rectal cancer. At this point time the patient did develop some urinary issues and had difficulty voiding. He did go to the emergency department with a had a Iverson catheter placed. Since then the patient has had difficulty with spasming around the Iverson catheter leaking urine. Patient had been seen by us more than a year ago. At that point time we did have him on Uroxatrol. Patient had since been taken off this medication. We did call him in this medication and restart this approximately 5 days ago. Patient reports no pain today.     Past Medical History:   Diagnosis Date    Back pain     Blood clots in brain     bilateral lower extremities    Broken toe     Colonic mass     Pulmonary emboli [facility])      Past Surgical History:   Procedure Laterality Date    COLECTOMY  11/06/2018    COLONOSCOPY       Outpatient Encounter Medications as of 2/17/2020   Medication Sig Dispense Refill    rivaroxaban (XARELTO) 10 MG TABS tablet Take 10 mg by mouth daily      alfuzosin (UROXATRAL) 10 MG extended release tablet Take 1 tablet by mouth daily 30 tablet 3    Cholecalciferol 400 UNIT TABS tablet Take 400 Units by mouth daily      Nicki, Zingiber officinalis, (NICKI PO) Take by mouth daily History     Substance and Sexual Activity   Alcohol Use No       Review of Systems    /70 (Site: Right Upper Arm, Position: Sitting, Cuff Size: Medium Adult)   Temp 97.7 °F (36.5 °C) (Oral)   Wt 153 lb (69.4 kg)   BMI 26.26 kg/m²       PHYSICAL EXAM:  Constitutional: Patient in no acute distress; Neuro: alert and oriented to person place and time. Psych: Mood and affect normal.  Skin: Normal  Lungs: Respiratory effort normal  Cardiovascular:  Normal peripheral pulses  Abdomen: Soft, non-tender, non-distended with no CVA, flank pain, hepatosplenomegaly or hernia. Kidneys normal.  Bladder non-tender and not distended. Lymphatics: no palpable lymphadenopathy  Penis normal  Urethral meatus normal  Scrotal exam normal  Testicles normal bilaterally  Epididymis normal bilaterally  No evidence of inguinal hernia  Anus and perineum normal  Normal rectal tone with no masses  Prostate soft, non-tender to palpation. No palpable nodules. Estimated 40 grams. Seminal vesicles not palpable. Lab Results   Component Value Date    BUN 14 12/27/2018     Lab Results   Component Value Date    CREATININE 0.81 12/27/2018     No results found for: PSA    ASSESSMENT:  This is a 68 y.o. male with the following diagnoses:   Diagnosis Orders   1. Retention of urine     2. BPH with obstruction/lower urinary tract symptoms           PLAN:  Change Uroxatrol. He was able to void after the Iverson catheter was removed today. He will follow-up with us in 2 to 3 weeks with a repeat postvoid residual.  We will have him maintain this Uroxatrol for the time being.

## 2020-02-17 NOTE — PROGRESS NOTES
Pt had alfonso catheter placed on 02/13/2020 for urinary retention. Sterile water was instilled into bladder per alfonso catheter, per gravity drainage. When pt verbalized urge to void, balloon was deflated and alfonso catheter was removed without difficulty. Pt was then encouraged to void. Pt tolerated procedure well. Filled with - 125 mL, patient did reflux all the fluid and then another 120 ml was instilled into the bladder. Pt able to void - yes - 100, PVR - 108 mL    Pt instructed to drink plenty of fluids, try to urinate q 2 hrs, call office in 6 hrs with update of amount voided, and if not voiding will need to return to office to have alfonso replaced. Also instructed to return to office or ER if goes >6 hrs without voiding or has strong urge to void/suprapubic discomfort and cannot urinate. Pt verbalizes understanding of plan.

## 2020-02-18 ENCOUNTER — TELEPHONE (OUTPATIENT)
Dept: UROLOGY | Age: 70
End: 2020-02-18

## 2020-02-18 NOTE — TELEPHONE ENCOUNTER
Patient's wife called to report that patient is voiding well. Wife advised to call office if patient has any urinary changes.  Wife voiced understanding

## 2020-02-29 NOTE — PROGRESS NOTES
for Nausea or Vomiting      prochlorperazine (COMPAZINE) 10 MG tablet Take 10 mg by mouth every 6 hours as needed      Cholecalciferol 400 UNIT TABS tablet Take 400 Units by mouth daily      Ginger, Zingiber officinalis, (GINGER PO) Take by mouth daily      TURMERIC PO Take by mouth daily      Multiple Vitamins-Minerals (THERAPEUTIC MULTIVITAMIN-MINERALS) tablet Take 1 tablet by mouth daily      vitamin C (ASCORBIC ACID) 500 MG tablet Take 500 mg by mouth daily       No facility-administered encounter medications on file as of 3/2/2020. Current Outpatient Medications on File Prior to Visit   Medication Sig Dispense Refill    rivaroxaban (XARELTO) 10 MG TABS tablet Take 10 mg by mouth daily      alfuzosin (UROXATRAL) 10 MG extended release tablet Take 1 tablet by mouth daily 30 tablet 3    hydroxypropyl methylcellulose (GONIOSOL) 2.5 % ophthalmic solution Place 1 drop into both eyes as needed for Dry Eyes (Patient not taking: Reported on 2/17/2020) 1 Bottle 2    ondansetron (ZOFRAN) 8 MG tablet Take 8 mg by mouth every 8 hours as needed for Nausea or Vomiting      prochlorperazine (COMPAZINE) 10 MG tablet Take 10 mg by mouth every 6 hours as needed      Cholecalciferol 400 UNIT TABS tablet Take 400 Units by mouth daily      Ginger, Zingiber officinalis, (GINGER PO) Take by mouth daily      TURMERIC PO Take by mouth daily      Multiple Vitamins-Minerals (THERAPEUTIC MULTIVITAMIN-MINERALS) tablet Take 1 tablet by mouth daily      vitamin C (ASCORBIC ACID) 500 MG tablet Take 500 mg by mouth daily       No current facility-administered medications on file prior to visit. Patient has no known allergies.   Family History   Problem Relation Age of Onset    Other Mother      Social History     Tobacco Use   Smoking Status Never Smoker   Smokeless Tobacco Never Used       Social History     Substance and Sexual Activity   Alcohol Use No       Review of Systems    There were no vitals taken for this visit.      PHYSICAL EXAM:  Constitutional: Patient in no acute distress; Neuro: alert and oriented to person place and time. Psych: Mood and affect normal.  Skin: Normal  Lungs: Respiratory effort normal  Cardiovascular:  Normal peripheral pulses  Abdomen: Soft, non-tender, non-distended with no CVA, flank pain, hepatosplenomegaly or hernia. Kidneys normal.  Bladder non-tender and not distended. Lymphatics: no palpable lymphadenopathy  Penis normal  Urethral meatus normal  Scrotal exam normal  Testicles normal bilaterally  Epididymis normal bilaterally  No evidence of inguinal hernia    Lab Results   Component Value Date    BUN 14 12/27/2018     Lab Results   Component Value Date    CREATININE 0.81 12/27/2018     No results found for: PSA    ASSESSMENT:  This is a 68 y.o. male with the following diagnoses:   Diagnosis Orders   1. Retention of urine  [place] MEASUREMENT,POST-VOID RESIDUAL VOLUME BY US,NON-IMAGING         PLAN:  Patient will follow-up with us in 3 to 4 weeks. This will be approximately 2 weeks after his chemo. He does have issues we will be available for him.

## 2020-03-02 ENCOUNTER — OFFICE VISIT (OUTPATIENT)
Dept: UROLOGY | Age: 70
End: 2020-03-02
Payer: MEDICARE

## 2020-03-02 VITALS — WEIGHT: 153 LBS | BODY MASS INDEX: 26.26 KG/M2

## 2020-03-02 PROCEDURE — G8417 CALC BMI ABV UP PARAM F/U: HCPCS | Performed by: UROLOGY

## 2020-03-02 PROCEDURE — 99214 OFFICE O/P EST MOD 30 MIN: CPT | Performed by: UROLOGY

## 2020-03-02 PROCEDURE — 99212 OFFICE O/P EST SF 10 MIN: CPT | Performed by: UROLOGY

## 2020-03-02 PROCEDURE — 1123F ACP DISCUSS/DSCN MKR DOCD: CPT | Performed by: UROLOGY

## 2020-03-02 PROCEDURE — G8484 FLU IMMUNIZE NO ADMIN: HCPCS | Performed by: UROLOGY

## 2020-03-02 PROCEDURE — G8427 DOCREV CUR MEDS BY ELIG CLIN: HCPCS | Performed by: UROLOGY

## 2020-03-02 PROCEDURE — 1036F TOBACCO NON-USER: CPT | Performed by: UROLOGY

## 2020-03-02 PROCEDURE — 4040F PNEUMOC VAC/ADMIN/RCVD: CPT | Performed by: UROLOGY

## 2020-03-02 PROCEDURE — 51798 US URINE CAPACITY MEASURE: CPT | Performed by: UROLOGY

## 2020-03-02 PROCEDURE — 3017F COLORECTAL CA SCREEN DOC REV: CPT | Performed by: UROLOGY

## 2020-03-25 ENCOUNTER — TELEPHONE (OUTPATIENT)
Dept: UROLOGY | Age: 70
End: 2020-03-25

## 2020-04-30 ENCOUNTER — TELEPHONE (OUTPATIENT)
Dept: UROLOGY | Age: 70
End: 2020-04-30

## 2020-05-20 ENCOUNTER — TELEPHONE (OUTPATIENT)
Dept: UROLOGY | Age: 70
End: 2020-05-20

## 2020-07-16 ENCOUNTER — OFFICE VISIT (OUTPATIENT)
Dept: UROLOGY | Age: 70
End: 2020-07-16
Payer: MEDICARE

## 2020-07-16 VITALS
HEART RATE: 77 BPM | BODY MASS INDEX: 26.09 KG/M2 | WEIGHT: 152 LBS | SYSTOLIC BLOOD PRESSURE: 129 MMHG | DIASTOLIC BLOOD PRESSURE: 79 MMHG | TEMPERATURE: 98.6 F

## 2020-07-16 PROCEDURE — 4040F PNEUMOC VAC/ADMIN/RCVD: CPT | Performed by: NURSE PRACTITIONER

## 2020-07-16 PROCEDURE — 51798 US URINE CAPACITY MEASURE: CPT | Performed by: NURSE PRACTITIONER

## 2020-07-16 PROCEDURE — 1123F ACP DISCUSS/DSCN MKR DOCD: CPT | Performed by: NURSE PRACTITIONER

## 2020-07-16 PROCEDURE — 99213 OFFICE O/P EST LOW 20 MIN: CPT | Performed by: NURSE PRACTITIONER

## 2020-07-16 PROCEDURE — G8417 CALC BMI ABV UP PARAM F/U: HCPCS | Performed by: NURSE PRACTITIONER

## 2020-07-16 PROCEDURE — 3017F COLORECTAL CA SCREEN DOC REV: CPT | Performed by: NURSE PRACTITIONER

## 2020-07-16 PROCEDURE — 1036F TOBACCO NON-USER: CPT | Performed by: NURSE PRACTITIONER

## 2020-07-16 PROCEDURE — G8427 DOCREV CUR MEDS BY ELIG CLIN: HCPCS | Performed by: NURSE PRACTITIONER

## 2020-07-16 RX ORDER — LEUCOVORIN CALCIUM 350 MG/17.5ML
10 INJECTION, POWDER, LYOPHILIZED, FOR SOLUTION INTRAMUSCULAR; INTRAVENOUS EVERY 6 HOURS
COMMUNITY

## 2020-07-16 ASSESSMENT — ENCOUNTER SYMPTOMS
EYE REDNESS: 0
NAUSEA: 0
CONSTIPATION: 0
BACK PAIN: 0
VOMITING: 0
SHORTNESS OF BREATH: 0
WHEEZING: 0
COLOR CHANGE: 0
COUGH: 0
ABDOMINAL PAIN: 0
EYE PAIN: 0

## 2020-07-16 NOTE — PROGRESS NOTES
HPI:    Patient is a 65 y.o. male in no acute distress. He is alert and oriented to person, place, and time. History  11/2018 Referral from Dr. Herb Manzano for urinary retention. On 11/6/2018 patient did undergo a colon resection due to colon cancer.  Postoperatively patient was unable to urinate and did have a Iverson catheter placed. Satiariadna Leeks to surgery patient denies baseline lower urinary tract symptoms.  He denies prior issues with retention. [facility] denies history of stones or gross hematuria. [facility] denies history of prostate cancer. [facility] has never seen urology in the past. [facility] is struggling with constipation since surgery. Started flomax    12/2018 Flomax stopped per patient request    2/2020 Urinary retention again. Started Uroxatrol.     3/2020 Patient does feel like he is having dizziness and constipation after starting Uroxatrol. Per patient request uroxatrol stopped. Today  Here today to follow-up for urinary retention. Patient is not taking any medications for BPH. He does continue chemotherapy for colon cancer. He denies frequency, urgency, nocturia or incontinence. He has had no further issues with retention. PVR today is 106ml.     Past Medical History:   Diagnosis Date    Back pain     Blood clots in brain     bilateral lower extremities    Broken toe     Colonic mass     Pulmonary emboli [facility])      Past Surgical History:   Procedure Laterality Date    COLECTOMY  11/06/2018    COLONOSCOPY       Outpatient Encounter Medications as of 7/16/2020   Medication Sig Dispense Refill    bevacizumab (AVASTIN) 400 MG/16ML chemo injection Infuse 5 mg/kg intravenously once As directed      hyoscyamine (LEVSIN/SL) 125 MCG sublingual tablet Place 125 mcg under the tongue every 4 hours as needed for Cramping      Irinotecan HCl (CAMPTOSAR IV) Infuse intravenously As directed      leucovorin calcium (WELLCOVORIN) 350 MG injection Infuse 10 mg/m2 intravenously every 6 hours As directed      NONFORMULARY Pyxis- as directed      rivaroxaban (XARELTO) 10 MG TABS tablet Take 20 mg by mouth daily       ondansetron (ZOFRAN) 8 MG tablet Take 8 mg by mouth every 8 hours as needed for Nausea or Vomiting      Cholecalciferol 400 UNIT TABS tablet Take 400 Units by mouth daily      Ginger, Zingiber officinalis, (GINGER PO) Take by mouth daily      TURMERIC PO Take by mouth daily      Multiple Vitamins-Minerals (THERAPEUTIC MULTIVITAMIN-MINERALS) tablet Take 1 tablet by mouth daily      vitamin C (ASCORBIC ACID) 500 MG tablet Take 500 mg by mouth daily      hydroxypropyl methylcellulose (GONIOSOL) 2.5 % ophthalmic solution Place 1 drop into both eyes as needed for Dry Eyes (Patient not taking: Reported on 2/17/2020) 1 Bottle 2    prochlorperazine (COMPAZINE) 10 MG tablet Take 10 mg by mouth every 6 hours as needed       No facility-administered encounter medications on file as of 7/16/2020.        Current Outpatient Medications on File Prior to Visit   Medication Sig Dispense Refill    bevacizumab (AVASTIN) 400 MG/16ML chemo injection Infuse 5 mg/kg intravenously once As directed      hyoscyamine (LEVSIN/SL) 125 MCG sublingual tablet Place 125 mcg under the tongue every 4 hours as needed for Cramping      Irinotecan HCl (CAMPTOSAR IV) Infuse intravenously As directed      leucovorin calcium (WELLCOVORIN) 350 MG injection Infuse 10 mg/m2 intravenously every 6 hours As directed      NONFORMULARY Pyxis- as directed      rivaroxaban (XARELTO) 10 MG TABS tablet Take 20 mg by mouth daily       ondansetron (ZOFRAN) 8 MG tablet Take 8 mg by mouth every 8 hours as needed for Nausea or Vomiting      Cholecalciferol 400 UNIT TABS tablet Take 400 Units by mouth daily      Ginger, Zingiber officinalis, (GINGER PO) Take by mouth daily      TURMERIC PO Take by mouth daily      Multiple Vitamins-Minerals (THERAPEUTIC MULTIVITAMIN-MINERALS) tablet Take 1 tablet by mouth daily      vitamin C (ASCORBIC ACID) 500 MG tablet Take 500 mg by mouth daily      hydroxypropyl methylcellulose (GONIOSOL) 2.5 % ophthalmic solution Place 1 drop into both eyes as needed for Dry Eyes (Patient not taking: Reported on 2/17/2020) 1 Bottle 2    prochlorperazine (COMPAZINE) 10 MG tablet Take 10 mg by mouth every 6 hours as needed       No current facility-administered medications on file prior to visit. Patient has no known allergies. Family History   Problem Relation Age of Onset    Other Mother      Social History     Tobacco Use   Smoking Status Never Smoker   Smokeless Tobacco Never Used       Social History     Substance and Sexual Activity   Alcohol Use No       Review of Systems   Constitutional: Negative for appetite change, chills and fever. Eyes: Negative for pain, redness and visual disturbance. Respiratory: Negative for cough, shortness of breath and wheezing. Cardiovascular: Negative for chest pain and leg swelling. Gastrointestinal: Negative for abdominal pain, constipation, nausea and vomiting. Genitourinary: Negative for decreased urine volume, difficulty urinating, discharge, dysuria, enuresis, flank pain, frequency, hematuria, penile pain, scrotal swelling, testicular pain and urgency. Musculoskeletal: Negative for back pain, joint swelling and myalgias. Skin: Negative for color change, rash and wound. Neurological: Negative for dizziness, tremors and numbness. Hematological: Negative for adenopathy. Does not bruise/bleed easily. /79 (Site: Left Upper Arm, Position: Sitting, Cuff Size: Medium Adult)   Pulse 77   Temp 98.6 °F (37 °C) (Infrared)   Wt 152 lb (68.9 kg)   BMI 26.09 kg/m²       PHYSICAL EXAM:  Constitutional: Patient in no acute distress; Neuro: alert and oriented to person place and time.     Psych: Mood and affect normal.  Skin: Normal  Lungs: Respiratory effort normal  Cardiovascular:  Normal peripheral pulses  Abdomen: Soft, non-tender, non-distended with no CVA, flank pain, hepatosplenomegaly or hernia. Kidneys normal.  Bladder non-tender and not distended. Lymphatics: no palpable lymphadenopathy  Penis normal  Urethral meatus normal  Scrotal exam normal  Testicles normal bilaterally        Lab Results   Component Value Date    BUN 14 12/27/2018     Lab Results   Component Value Date    CREATININE 0.81 12/27/2018     No results found for: PSA    ASSESSMENT:   Diagnosis Orders   1. Retention of urine  MI MEASUREMENT,POST-VOID RESIDUAL VOLUME BY US,NON-IMAGING   2. BPH with obstruction/lower urinary tract symptoms  MI MEASUREMENT,POST-VOID RESIDUAL VOLUME BY US,NON-IMAGING           PLAN:  For now, he will remain off of medications. He does understand that if we have another episode of retention he will need to maintain medications long-term. We will plan to see him back in 3 to 4 months to recheck a PVR. If this remains low and stable we will move him out to 6-month follow-up.

## 2020-07-16 NOTE — PATIENT INSTRUCTIONS
SURVEY:    You may be receiving a survey from [employer] regarding your visit today. Please complete the survey to enable us to provide the highest quality of care to you and your family. If you cannot score us a very good on any question, please call the office to discuss how we could have made your experience a very good one. Thank you.

## 2020-11-09 NOTE — PROGRESS NOTES
Vaccine Information Statement(s) was given today. This has been reviewed, questions answered, and verbal consent given by Patient for injection(s) and administration of Influenza (Inactivated).    Patient tolerated without incident. See immunization grid for documentation.         pulmonary embolism with acute cor pulmonale (HCC)    Stroke-like symptoms    Malignant neoplasm of colon ([facility] [address]  Resolved Problems:    * No resolved hospital problems.  *    Assessment/Plan  Alert oriented   No distress  VSS  On Lovenox 1mg /kg BID  Ok to dc on Lovenox  FU with oncology for rectal ca treatment     Idalmis Cochran MD  Attending and [facility]  Internal Medicine [address] [place] [address], [place].   12/27/18

## 2020-11-10 ENCOUNTER — TELEPHONE (OUTPATIENT)
Dept: UROLOGY | Age: 70
End: 2020-11-10

## 2020-11-10 NOTE — TELEPHONE ENCOUNTER
We will check a urine tomorrow during his visit. He needs a PVR. I am more concerned with retention at this point.

## 2020-11-10 NOTE — TELEPHONE ENCOUNTER
Patient's wife called stating patient has pain in penis with urination. No burning. No fever or chills. She states he is fine as long as he drinks fluids. He is also going to [facility] today for cancer treatment. Appointment scheduled for tomorrow. Urine prior?

## 2020-11-11 ENCOUNTER — OFFICE VISIT (OUTPATIENT)
Dept: UROLOGY | Age: 70
End: 2020-11-11
Payer: MEDICARE

## 2020-11-11 VITALS
SYSTOLIC BLOOD PRESSURE: 112 MMHG | TEMPERATURE: 97.7 F | WEIGHT: 152 LBS | BODY MASS INDEX: 26.09 KG/M2 | DIASTOLIC BLOOD PRESSURE: 76 MMHG

## 2020-11-11 PROCEDURE — 51798 US URINE CAPACITY MEASURE: CPT | Performed by: PHYSICIAN ASSISTANT

## 2020-11-11 PROCEDURE — 3017F COLORECTAL CA SCREEN DOC REV: CPT | Performed by: PHYSICIAN ASSISTANT

## 2020-11-11 PROCEDURE — 1123F ACP DISCUSS/DSCN MKR DOCD: CPT | Performed by: PHYSICIAN ASSISTANT

## 2020-11-11 PROCEDURE — 4040F PNEUMOC VAC/ADMIN/RCVD: CPT | Performed by: PHYSICIAN ASSISTANT

## 2020-11-11 PROCEDURE — G8484 FLU IMMUNIZE NO ADMIN: HCPCS | Performed by: PHYSICIAN ASSISTANT

## 2020-11-11 PROCEDURE — G8417 CALC BMI ABV UP PARAM F/U: HCPCS | Performed by: PHYSICIAN ASSISTANT

## 2020-11-11 PROCEDURE — G8427 DOCREV CUR MEDS BY ELIG CLIN: HCPCS | Performed by: PHYSICIAN ASSISTANT

## 2020-11-11 PROCEDURE — 99214 OFFICE O/P EST MOD 30 MIN: CPT | Performed by: PHYSICIAN ASSISTANT

## 2020-11-11 PROCEDURE — 1036F TOBACCO NON-USER: CPT | Performed by: PHYSICIAN ASSISTANT

## 2020-11-11 RX ORDER — CIPROFLOXACIN 500 MG/1
500 TABLET, FILM COATED ORAL 2 TIMES DAILY
COMMUNITY
Start: 2020-11-10

## 2020-11-11 ASSESSMENT — ENCOUNTER SYMPTOMS
EYE PAIN: 0
COUGH: 0
WHEEZING: 0
VOMITING: 0
BACK PAIN: 0
EYE REDNESS: 0
COLOR CHANGE: 0
NAUSEA: 0
SHORTNESS OF BREATH: 0
CONSTIPATION: 0
ABDOMINAL PAIN: 0

## 2020-11-11 NOTE — PATIENT INSTRUCTIONS
SURVEY:    You may be receiving a survey from [employer] regarding your visit today. Please complete the survey to enable us to provide the highest quality of care to you and your family. If you cannot score us a very good on any question, please call the office to discuss how we could have made your experience a very good one. Thank you.

## 2020-11-11 NOTE — PROGRESS NOTES
HPI:      Patient is a 65 y.o. male in no acute distress. He is alert and oriented to person, place, and time. History  11/2018 Referral from Dr. Davin Cervantes for urinary retention. On 11/6/2018 patient did undergo a colon resection due to colon cancer.  Postoperatively patient was unable to urinate and did have a Iverson catheter placed. Ceclia Bottoms to surgery patient denies baseline lower urinary tract symptoms.  He denies prior issues with retention. [facility] denies history of stones or gross hematuria. [facility] denies history of prostate cancer. [facility] has never seen urology in the past. [facility] is struggling with constipation since surgery. Started flomax    12/2018 Flomax stopped per patient request    2/2020 Urinary retention again. Started Uroxatrol.     3/2020 Patient does feel like he is having dizziness and constipation after starting Uroxatrol. Per patient request uroxatrol stopped. Today:  Patient is here today for penile pain/dysuria. Patient also has a history of urinary retention. Patient is currently not on any medications from our office as he could not tolerate Flomax or Uroxatrol. Patient does have colorectal cancer and is followed by the [facility]. He currently is receiving a 48-hour infusion of chemotherapy. Patient's penile pain and dysuria started approximately 2 days prior to this visit. He states that the discomfort is only when he is peeing and resolves after he is done. Patient states that he was hospitalized at [facility] and was in the intensive care unit for a possible blood clot and nosebleed. This was a week or 2 ago. He says during that visit in the ICU they did not want him to walk around had him on bedrest.  He states that he was able to urinate on his own but the team there decided to place a catheter in. He states that he had a catheter in for 3 days prior to it being removed. He did notify [facility] of this and they did start him on Cipro.   Since then the pain has improved but has not gone away. Patient voided - 150 mL, PVR - 171mL. He denies any fever, chills, hematuria, flank pain. He denies any urinary urgency or frequency. He denies any sensation of incomplete bladder emptying. He does have a bowel movement 6 out of 7 days.       Past Medical History:   Diagnosis Date    Back pain     Blood clots in brain     bilateral lower extremities    Broken toe     Colonic mass     Pulmonary emboli [facility])      Past Surgical History:   Procedure Laterality Date    COLECTOMY  11/06/2018    COLONOSCOPY       Outpatient Encounter Medications as of 11/11/2020   Medication Sig Dispense Refill    ciprofloxacin (CIPRO) 500 MG tablet Take 500 mg by mouth 2 times daily      rivaroxaban (XARELTO) 10 MG TABS tablet Take 20 mg by mouth daily       Ginger, Zingiber officinalis, (GINGER PO) Take by mouth daily      Multiple Vitamins-Minerals (THERAPEUTIC MULTIVITAMIN-MINERALS) tablet Take 1 tablet by mouth daily      vitamin C (ASCORBIC ACID) 500 MG tablet Take 500 mg by mouth daily      bevacizumab (AVASTIN) 400 MG/16ML chemo injection Infuse 5 mg/kg intravenously once As directed      hyoscyamine (LEVSIN/SL) 125 MCG sublingual tablet Place 125 mcg under the tongue every 4 hours as needed for Cramping      Irinotecan HCl (CAMPTOSAR IV) Infuse intravenously As directed      leucovorin calcium (WELLCOVORIN) 350 MG injection Infuse 10 mg/m2 intravenously every 6 hours As directed      NONFORMULARY Pyxis- as directed      hydroxypropyl methylcellulose (GONIOSOL) 2.5 % ophthalmic solution Place 1 drop into both eyes as needed for Dry Eyes (Patient not taking: Reported on 2/17/2020) 1 Bottle 2    ondansetron (ZOFRAN) 8 MG tablet Take 8 mg by mouth every 8 hours as needed for Nausea or Vomiting      prochlorperazine (COMPAZINE) 10 MG tablet Take 10 mg by mouth every 6 hours as needed      Cholecalciferol 400 UNIT TABS tablet Take 400 Units by mouth daily      TURMERIC PO Take by mouth daily       No facility-administered encounter medications on file as of 11/11/2020. Current Outpatient Medications on File Prior to Visit   Medication Sig Dispense Refill    ciprofloxacin (CIPRO) 500 MG tablet Take 500 mg by mouth 2 times daily      rivaroxaban (XARELTO) 10 MG TABS tablet Take 20 mg by mouth daily       Ginger, Zingiber officinalis, (GINGER PO) Take by mouth daily      Multiple Vitamins-Minerals (THERAPEUTIC MULTIVITAMIN-MINERALS) tablet Take 1 tablet by mouth daily      vitamin C (ASCORBIC ACID) 500 MG tablet Take 500 mg by mouth daily      bevacizumab (AVASTIN) 400 MG/16ML chemo injection Infuse 5 mg/kg intravenously once As directed      hyoscyamine (LEVSIN/SL) 125 MCG sublingual tablet Place 125 mcg under the tongue every 4 hours as needed for Cramping      Irinotecan HCl (CAMPTOSAR IV) Infuse intravenously As directed      leucovorin calcium (WELLCOVORIN) 350 MG injection Infuse 10 mg/m2 intravenously every 6 hours As directed      NONFORMULARY Pyxis- as directed      hydroxypropyl methylcellulose (GONIOSOL) 2.5 % ophthalmic solution Place 1 drop into both eyes as needed for Dry Eyes (Patient not taking: Reported on 2/17/2020) 1 Bottle 2    ondansetron (ZOFRAN) 8 MG tablet Take 8 mg by mouth every 8 hours as needed for Nausea or Vomiting      prochlorperazine (COMPAZINE) 10 MG tablet Take 10 mg by mouth every 6 hours as needed      Cholecalciferol 400 UNIT TABS tablet Take 400 Units by mouth daily      TURMERIC PO Take by mouth daily       No current facility-administered medications on file prior to visit. Patient has no known allergies. Family History   Problem Relation Age of Onset    Other Mother      Social History     Tobacco Use   Smoking Status Never Smoker   Smokeless Tobacco Never Used       Social History     Substance and Sexual Activity   Alcohol Use No       Review of Systems   Constitutional: Negative for appetite change, chills and fever. Eyes: Negative for pain, redness and visual disturbance. Respiratory: Negative for cough, shortness of breath and wheezing. Cardiovascular: Negative for chest pain and leg swelling. Gastrointestinal: Negative for abdominal pain, constipation, nausea and vomiting. Genitourinary: Positive for dysuria and penile pain. Negative for decreased urine volume, difficulty urinating, discharge, enuresis, flank pain, frequency, hematuria, scrotal swelling, testicular pain and urgency. Musculoskeletal: Negative for back pain, joint swelling and myalgias. Skin: Negative for color change, rash and wound. Neurological: Negative for dizziness, tremors and numbness. Hematological: Negative for adenopathy. Does not bruise/bleed easily. /76 (Site: Right Upper Arm, Position: Sitting, Cuff Size: Medium Adult)   Temp 97.7 °F (36.5 °C) (Temporal)   Wt 152 lb (68.9 kg)   BMI 26.09 kg/m²       PHYSICAL EXAM:  Constitutional: Patient in no acute distress; Neuro: alert and oriented to person place and time. Psych: Mood and affect normal.  Lungs: Respiratory effort normal  Cardiovascular:  Normal peripheral pulses  Abdomen: Soft, non-tender, non-distended with no CVA, flank pain, hepatosplenomegaly or hernia. Bladder non-tender and not distended. Lymphatics: no visible lymphadenopathy  Rectal: deferred       Lab Results   Component Value Date    BUN 14 12/27/2018     Lab Results   Component Value Date    CREATININE 0.81 12/27/2018     No results found for: PSA    ASSESSMENT:   Diagnosis Orders   1. BPH with obstruction/lower urinary tract symptoms  UT MEASUREMENT,POST-VOID RESIDUAL VOLUME BY US,NON-IMAGING   2. Retention of urine  UT MEASUREMENT,POST-VOID RESIDUAL VOLUME BY US,NON-IMAGING   3. Urinary tract infection associated with indwelling urethral catheter, initial encounter [facility])           PLAN:  Patient does continue to have urinary retention. His PVR today was 171.   This is not as high as it is been in the past.  Patient does not drink any caffeinated products. He does drink mostly water during the day. We did offer him cystoscopy to further evaluate his incomplete bladder emptying/urinary retention. Discussed the risk and benefits of the procedure. At this time they wish to decline. Regretfully he is unable to tolerate alpha blockers and therefore this is not an option for him. Advised him to continue the Cipro as given by [facility]    We will follow up on this culture and if he does have an infection we would need to treat him for total of 7 days. He will follow-up with us in 3 months to reevaluate his incomplete bladder emptying. Spent 30 mins, >50% time face-to-face, discussing diagnoses, any applicable test results, treatment plan/options, and prognosis.

## 2020-11-12 ENCOUNTER — TELEPHONE (OUTPATIENT)
Dept: UROLOGY | Age: 70
End: 2020-11-12

## 2022-04-14 NOTE — PLAN OF CARE
Problem: Falls - Risk of:  Goal: Absence of physical injury  Absence of physical injury   Outcome: Ongoing      Problem: Bleeding:  Goal: Will show no signs and symptoms of excessive bleeding  Will show no signs and symptoms of excessive bleeding   Outcome: Ongoing      Problem: Pain:  Goal: Control of acute pain  Control of acute pain   Outcome: Ongoing
: Yes